# Patient Record
Sex: MALE | Race: BLACK OR AFRICAN AMERICAN | URBAN - METROPOLITAN AREA
[De-identification: names, ages, dates, MRNs, and addresses within clinical notes are randomized per-mention and may not be internally consistent; named-entity substitution may affect disease eponyms.]

---

## 2017-01-05 DIAGNOSIS — G43.009 MIGRAINE WITHOUT AURA AND WITHOUT STATUS MIGRAINOSUS, NOT INTRACTABLE: ICD-10-CM

## 2017-01-05 RX ORDER — RIZATRIPTAN BENZOATE 5 MG/1
5 TABLET ORAL
Qty: 12 TABLET | Refills: 3 | Status: SHIPPED | OUTPATIENT
Start: 2017-01-05 | End: 2017-03-08 | Stop reason: DRUGHIGH

## 2017-02-10 DIAGNOSIS — E78.5 HYPERLIPIDEMIA: ICD-10-CM

## 2017-02-10 DIAGNOSIS — I10 ESSENTIAL HYPERTENSION: ICD-10-CM

## 2017-02-10 RX ORDER — SIMVASTATIN 20 MG
TABLET ORAL
Qty: 90 TABLET | Refills: 1 | Status: SHIPPED | OUTPATIENT
Start: 2017-02-10 | End: 2017-08-07 | Stop reason: SDUPTHER

## 2017-02-10 RX ORDER — AMLODIPINE BESYLATE 10 MG/1
TABLET ORAL
Qty: 90 TABLET | Refills: 1 | Status: SHIPPED | OUTPATIENT
Start: 2017-02-10 | End: 2017-08-07 | Stop reason: SDUPTHER

## 2017-02-16 ENCOUNTER — OFFICE VISIT (OUTPATIENT)
Dept: INTERNAL MEDICINE CLINIC | Age: 57
End: 2017-02-16

## 2017-02-16 VITALS
SYSTOLIC BLOOD PRESSURE: 144 MMHG | WEIGHT: 194.4 LBS | TEMPERATURE: 97.8 F | BODY MASS INDEX: 30.51 KG/M2 | HEART RATE: 56 BPM | DIASTOLIC BLOOD PRESSURE: 80 MMHG | RESPIRATION RATE: 12 BRPM | HEIGHT: 67 IN | OXYGEN SATURATION: 98 %

## 2017-02-16 DIAGNOSIS — M25.649 STIFFNESS OF THUMB JOINT: ICD-10-CM

## 2017-02-16 DIAGNOSIS — E78.5 HYPERLIPIDEMIA, UNSPECIFIED HYPERLIPIDEMIA TYPE: ICD-10-CM

## 2017-02-16 DIAGNOSIS — G43.009 MIGRAINE WITHOUT AURA AND WITHOUT STATUS MIGRAINOSUS, NOT INTRACTABLE: ICD-10-CM

## 2017-02-16 DIAGNOSIS — M79.645 THUMB PAIN, LEFT: ICD-10-CM

## 2017-02-16 DIAGNOSIS — I10 ESSENTIAL HYPERTENSION: Primary | ICD-10-CM

## 2017-02-16 PROCEDURE — 99214 OFFICE O/P EST MOD 30 MIN: CPT | Performed by: INTERNAL MEDICINE

## 2017-02-16 RX ORDER — HYDROCHLOROTHIAZIDE 12.5 MG/1
12.5 TABLET ORAL DAILY
Qty: 30 TABLET | Refills: 0 | Status: SHIPPED | OUTPATIENT
Start: 2017-02-16 | End: 2017-03-27 | Stop reason: SDUPTHER

## 2017-02-16 RX ORDER — RIZATRIPTAN BENZOATE 10 MG/1
10 TABLET ORAL
Qty: 12 TABLET | Refills: 3 | Status: SHIPPED | OUTPATIENT
Start: 2017-02-16 | End: 2019-05-14

## 2017-02-20 ASSESSMENT — ENCOUNTER SYMPTOMS
VOMITING: 0
SORE THROAT: 0
COUGH: 0
CONSTIPATION: 0
WHEEZING: 0
DIARRHEA: 0
ABDOMINAL PAIN: 0
CHEST TIGHTNESS: 0
SHORTNESS OF BREATH: 0
NAUSEA: 0
RHINORRHEA: 0

## 2017-03-08 ENCOUNTER — OFFICE VISIT (OUTPATIENT)
Dept: INTERNAL MEDICINE CLINIC | Age: 57
End: 2017-03-08

## 2017-03-08 VITALS
HEIGHT: 67 IN | DIASTOLIC BLOOD PRESSURE: 90 MMHG | WEIGHT: 196.4 LBS | HEART RATE: 52 BPM | OXYGEN SATURATION: 98 % | TEMPERATURE: 97.9 F | SYSTOLIC BLOOD PRESSURE: 160 MMHG | BODY MASS INDEX: 30.83 KG/M2 | RESPIRATION RATE: 12 BRPM

## 2017-03-08 DIAGNOSIS — I10 ESSENTIAL HYPERTENSION: Primary | ICD-10-CM

## 2017-03-08 PROCEDURE — 99213 OFFICE O/P EST LOW 20 MIN: CPT | Performed by: INTERNAL MEDICINE

## 2017-03-08 RX ORDER — LISINOPRIL 10 MG/1
10 TABLET ORAL DAILY
Qty: 30 TABLET | Refills: 1 | Status: SHIPPED | OUTPATIENT
Start: 2017-03-08 | End: 2017-04-05 | Stop reason: SINTOL

## 2017-03-08 ASSESSMENT — ENCOUNTER SYMPTOMS
SHORTNESS OF BREATH: 0
CHEST TIGHTNESS: 0

## 2017-03-29 RX ORDER — HYDROCHLOROTHIAZIDE 12.5 MG/1
12.5 TABLET ORAL DAILY
Qty: 30 TABLET | Refills: 1 | Status: SHIPPED | OUTPATIENT
Start: 2017-03-29 | End: 2017-04-05 | Stop reason: ALTCHOICE

## 2017-04-05 ENCOUNTER — OFFICE VISIT (OUTPATIENT)
Dept: INTERNAL MEDICINE CLINIC | Age: 57
End: 2017-04-05

## 2017-04-05 VITALS
OXYGEN SATURATION: 99 % | WEIGHT: 195.4 LBS | HEIGHT: 67 IN | DIASTOLIC BLOOD PRESSURE: 84 MMHG | TEMPERATURE: 97.8 F | SYSTOLIC BLOOD PRESSURE: 156 MMHG | BODY MASS INDEX: 30.67 KG/M2 | RESPIRATION RATE: 12 BRPM | HEART RATE: 50 BPM

## 2017-04-05 DIAGNOSIS — I10 ESSENTIAL HYPERTENSION: Primary | ICD-10-CM

## 2017-04-05 PROCEDURE — 99213 OFFICE O/P EST LOW 20 MIN: CPT | Performed by: INTERNAL MEDICINE

## 2017-04-05 RX ORDER — VALSARTAN AND HYDROCHLOROTHIAZIDE 160; 12.5 MG/1; MG/1
1 TABLET, FILM COATED ORAL DAILY
Qty: 30 TABLET | Refills: 1 | Status: SHIPPED | OUTPATIENT
Start: 2017-04-05 | End: 2017-06-01 | Stop reason: SDUPTHER

## 2017-04-05 ASSESSMENT — ENCOUNTER SYMPTOMS
SHORTNESS OF BREATH: 0
CHEST TIGHTNESS: 0

## 2017-05-16 DIAGNOSIS — E79.0 ELEVATED BLOOD URIC ACID LEVEL: ICD-10-CM

## 2017-05-16 RX ORDER — ALLOPURINOL 300 MG/1
300 TABLET ORAL DAILY
Qty: 90 TABLET | Refills: 1 | Status: SHIPPED | OUTPATIENT
Start: 2017-05-16 | End: 2017-11-13 | Stop reason: SDUPTHER

## 2017-06-01 ENCOUNTER — OFFICE VISIT (OUTPATIENT)
Dept: INTERNAL MEDICINE CLINIC | Age: 57
End: 2017-06-01

## 2017-06-01 VITALS
WEIGHT: 195.2 LBS | RESPIRATION RATE: 12 BRPM | DIASTOLIC BLOOD PRESSURE: 76 MMHG | BODY MASS INDEX: 30.64 KG/M2 | OXYGEN SATURATION: 96 % | SYSTOLIC BLOOD PRESSURE: 136 MMHG | TEMPERATURE: 97.9 F | HEIGHT: 67 IN | HEART RATE: 53 BPM

## 2017-06-01 DIAGNOSIS — M10.00 IDIOPATHIC GOUT, UNSPECIFIED CHRONICITY, UNSPECIFIED SITE: ICD-10-CM

## 2017-06-01 DIAGNOSIS — I10 ESSENTIAL HYPERTENSION: ICD-10-CM

## 2017-06-01 DIAGNOSIS — E78.5 HYPERLIPIDEMIA, UNSPECIFIED HYPERLIPIDEMIA TYPE: ICD-10-CM

## 2017-06-01 DIAGNOSIS — E79.0 ELEVATED BLOOD URIC ACID LEVEL: ICD-10-CM

## 2017-06-01 DIAGNOSIS — Z00.00 ANNUAL PHYSICAL EXAM: Primary | ICD-10-CM

## 2017-06-01 DIAGNOSIS — Z12.5 SCREENING PSA (PROSTATE SPECIFIC ANTIGEN): ICD-10-CM

## 2017-06-01 DIAGNOSIS — G43.009 MIGRAINE WITHOUT AURA AND WITHOUT STATUS MIGRAINOSUS, NOT INTRACTABLE: ICD-10-CM

## 2017-06-01 DIAGNOSIS — Z12.11 COLON CANCER SCREENING: ICD-10-CM

## 2017-06-01 LAB
BASOPHILS ABSOLUTE: 0 K/UL (ref 0–0.2)
BASOPHILS RELATIVE PERCENT: 0.5 %
BILIRUBIN, POC: NORMAL
BLOOD URINE, POC: NORMAL
CLARITY, POC: CLEAR
COLOR, POC: YELLOW
EOSINOPHILS ABSOLUTE: 0.2 K/UL (ref 0–0.6)
EOSINOPHILS RELATIVE PERCENT: 3 %
GLUCOSE URINE, POC: NORMAL
HCT VFR BLD CALC: 38.7 % (ref 40.5–52.5)
HEMOCCULT STL QL: NEGATIVE
HEMOCCULT STL QL: NORMAL
HEMOCCULT STL QL: NORMAL
HEMOGLOBIN: 12.6 G/DL (ref 13.5–17.5)
KETONES, POC: NORMAL
LEUKOCYTE EST, POC: NORMAL
LYMPHOCYTES ABSOLUTE: 1.9 K/UL (ref 1–5.1)
LYMPHOCYTES RELATIVE PERCENT: 33.2 %
MCH RBC QN AUTO: 29.8 PG (ref 26–34)
MCHC RBC AUTO-ENTMCNC: 32.6 G/DL (ref 31–36)
MCV RBC AUTO: 91.4 FL (ref 80–100)
MONOCYTES ABSOLUTE: 0.4 K/UL (ref 0–1.3)
MONOCYTES RELATIVE PERCENT: 6.9 %
NEUTROPHILS ABSOLUTE: 3.2 K/UL (ref 1.7–7.7)
NEUTROPHILS RELATIVE PERCENT: 56.4 %
NITRITE, POC: NORMAL
PDW BLD-RTO: 15.4 % (ref 12.4–15.4)
PH, POC: 7.5
PLATELET # BLD: 203 K/UL (ref 135–450)
PMV BLD AUTO: 8 FL (ref 5–10.5)
PROTEIN, POC: NORMAL
RBC # BLD: 4.23 M/UL (ref 4.2–5.9)
SPECIFIC GRAVITY, POC: 1.02
UROBILINOGEN, POC: 0.2
WBC # BLD: 5.7 K/UL (ref 4–11)

## 2017-06-01 PROCEDURE — 81002 URINALYSIS NONAUTO W/O SCOPE: CPT | Performed by: INTERNAL MEDICINE

## 2017-06-01 PROCEDURE — 82270 OCCULT BLOOD FECES: CPT | Performed by: INTERNAL MEDICINE

## 2017-06-01 PROCEDURE — 99396 PREV VISIT EST AGE 40-64: CPT | Performed by: INTERNAL MEDICINE

## 2017-06-01 RX ORDER — VALSARTAN AND HYDROCHLOROTHIAZIDE 160; 12.5 MG/1; MG/1
1 TABLET, FILM COATED ORAL DAILY
Qty: 30 TABLET | Refills: 0 | Status: SHIPPED | OUTPATIENT
Start: 2017-06-01 | End: 2017-06-19 | Stop reason: SDUPTHER

## 2017-06-01 RX ORDER — SUMATRIPTAN 100 MG/1
TABLET, FILM COATED ORAL
COMMUNITY
Start: 2017-03-23 | End: 2017-09-13 | Stop reason: SDUPTHER

## 2017-06-01 ASSESSMENT — PATIENT HEALTH QUESTIONNAIRE - PHQ9
2. FEELING DOWN, DEPRESSED OR HOPELESS: 0
SUM OF ALL RESPONSES TO PHQ9 QUESTIONS 1 & 2: 0
1. LITTLE INTEREST OR PLEASURE IN DOING THINGS: 0
SUM OF ALL RESPONSES TO PHQ QUESTIONS 1-9: 0

## 2017-06-02 LAB
A/G RATIO: 1.9 (ref 1.1–2.2)
ALBUMIN SERPL-MCNC: 4.7 G/DL (ref 3.4–5)
ALP BLD-CCNC: 53 U/L (ref 40–129)
ALT SERPL-CCNC: 24 U/L (ref 10–40)
ANION GAP SERPL CALCULATED.3IONS-SCNC: 13 MMOL/L (ref 3–16)
AST SERPL-CCNC: 32 U/L (ref 15–37)
BILIRUB SERPL-MCNC: 0.5 MG/DL (ref 0–1)
BUN BLDV-MCNC: 23 MG/DL (ref 7–20)
CALCIUM SERPL-MCNC: 9.5 MG/DL (ref 8.3–10.6)
CHLORIDE BLD-SCNC: 101 MMOL/L (ref 99–110)
CHOLESTEROL, TOTAL: 163 MG/DL (ref 0–199)
CO2: 28 MMOL/L (ref 21–32)
CREAT SERPL-MCNC: 1.2 MG/DL (ref 0.9–1.3)
GFR AFRICAN AMERICAN: >60
GFR NON-AFRICAN AMERICAN: >60
GLOBULIN: 2.5 G/DL
GLUCOSE BLD-MCNC: 94 MG/DL (ref 70–99)
HDLC SERPL-MCNC: 56 MG/DL (ref 40–60)
LDL CHOLESTEROL CALCULATED: 88 MG/DL
POTASSIUM SERPL-SCNC: 4.2 MMOL/L (ref 3.5–5.1)
PROSTATE SPECIFIC ANTIGEN: 2.66 NG/ML (ref 0–4)
SODIUM BLD-SCNC: 142 MMOL/L (ref 136–145)
TOTAL PROTEIN: 7.2 G/DL (ref 6.4–8.2)
TRIGL SERPL-MCNC: 93 MG/DL (ref 0–150)
TSH SERPL DL<=0.05 MIU/L-ACNC: 0.69 UIU/ML (ref 0.27–4.2)
URIC ACID, SERUM: 5.7 MG/DL (ref 3.5–7.2)
VLDLC SERPL CALC-MCNC: 19 MG/DL

## 2017-06-05 ASSESSMENT — ENCOUNTER SYMPTOMS
RHINORRHEA: 0
VOMITING: 0
EYE REDNESS: 0
BACK PAIN: 0
SINUS PRESSURE: 0
CONSTIPATION: 0
CHOKING: 0
EYE PAIN: 0
EYE DISCHARGE: 0
RECTAL PAIN: 0
VOICE CHANGE: 0
ANAL BLEEDING: 0
FACIAL SWELLING: 0
ABDOMINAL PAIN: 0
COUGH: 0
BLOOD IN STOOL: 0
WHEEZING: 0
ABDOMINAL DISTENTION: 0
NAUSEA: 0
TROUBLE SWALLOWING: 0
CHEST TIGHTNESS: 0
EYE ITCHING: 0
SHORTNESS OF BREATH: 0
APNEA: 0
PHOTOPHOBIA: 0
SORE THROAT: 0
DIARRHEA: 0

## 2017-06-18 ENCOUNTER — PATIENT MESSAGE (OUTPATIENT)
Dept: INTERNAL MEDICINE CLINIC | Age: 57
End: 2017-06-18

## 2017-06-18 DIAGNOSIS — I10 ESSENTIAL HYPERTENSION: ICD-10-CM

## 2017-06-19 DIAGNOSIS — I10 ESSENTIAL HYPERTENSION: ICD-10-CM

## 2017-06-19 RX ORDER — VALSARTAN AND HYDROCHLOROTHIAZIDE 160; 12.5 MG/1; MG/1
1 TABLET, FILM COATED ORAL DAILY
Qty: 90 TABLET | Refills: 1 | Status: SHIPPED | OUTPATIENT
Start: 2017-06-19 | End: 2017-10-06 | Stop reason: SDUPTHER

## 2017-06-20 RX ORDER — VALSARTAN AND HYDROCHLOROTHIAZIDE 160; 12.5 MG/1; MG/1
TABLET, FILM COATED ORAL
Qty: 30 TABLET | Refills: 2 | Status: SHIPPED | OUTPATIENT
Start: 2017-06-20 | End: 2017-10-02 | Stop reason: SDUPTHER

## 2017-06-26 RX ORDER — CLONIDINE HYDROCHLORIDE 0.1 MG/1
TABLET ORAL
Qty: 270 TABLET | Refills: 2 | Status: SHIPPED | OUTPATIENT
Start: 2017-06-26 | End: 2018-03-23 | Stop reason: SDUPTHER

## 2017-08-07 DIAGNOSIS — I10 ESSENTIAL HYPERTENSION: ICD-10-CM

## 2017-08-07 DIAGNOSIS — E78.5 HYPERLIPIDEMIA: ICD-10-CM

## 2017-08-07 RX ORDER — SIMVASTATIN 20 MG
TABLET ORAL
Qty: 90 TABLET | Refills: 2 | Status: SHIPPED | OUTPATIENT
Start: 2017-08-07 | End: 2018-05-04 | Stop reason: SDUPTHER

## 2017-08-07 RX ORDER — AMLODIPINE BESYLATE 10 MG/1
TABLET ORAL
Qty: 90 TABLET | Refills: 2 | Status: SHIPPED | OUTPATIENT
Start: 2017-08-07 | End: 2018-05-04 | Stop reason: SDUPTHER

## 2017-08-10 DIAGNOSIS — E78.5 HYPERLIPIDEMIA: ICD-10-CM

## 2017-08-10 DIAGNOSIS — I10 ESSENTIAL HYPERTENSION: ICD-10-CM

## 2017-08-10 RX ORDER — AMLODIPINE BESYLATE 10 MG/1
TABLET ORAL
Qty: 90 TABLET | Refills: 2 | Status: CANCELLED | OUTPATIENT
Start: 2017-08-10

## 2017-08-10 RX ORDER — SIMVASTATIN 20 MG
TABLET ORAL
Qty: 90 TABLET | Refills: 2 | Status: CANCELLED | OUTPATIENT
Start: 2017-08-10

## 2017-09-12 ENCOUNTER — PATIENT MESSAGE (OUTPATIENT)
Dept: INTERNAL MEDICINE CLINIC | Age: 57
End: 2017-09-12

## 2017-09-13 RX ORDER — SUMATRIPTAN 100 MG/1
100 TABLET, FILM COATED ORAL
Qty: 9 TABLET | Refills: 5 | Status: SHIPPED | OUTPATIENT
Start: 2017-09-13 | End: 2017-12-05 | Stop reason: RX

## 2017-10-01 ENCOUNTER — PATIENT MESSAGE (OUTPATIENT)
Dept: INTERNAL MEDICINE CLINIC | Age: 57
End: 2017-10-01

## 2017-10-02 ENCOUNTER — PATIENT MESSAGE (OUTPATIENT)
Dept: INTERNAL MEDICINE CLINIC | Age: 57
End: 2017-10-02

## 2017-10-02 DIAGNOSIS — I10 ESSENTIAL HYPERTENSION: ICD-10-CM

## 2017-10-02 RX ORDER — VALSARTAN AND HYDROCHLOROTHIAZIDE 160; 12.5 MG/1; MG/1
TABLET, FILM COATED ORAL
Qty: 90 TABLET | Refills: 1 | Status: SHIPPED | OUTPATIENT
Start: 2017-10-02 | End: 2018-03-24 | Stop reason: SDUPTHER

## 2017-10-06 ENCOUNTER — OFFICE VISIT (OUTPATIENT)
Dept: INTERNAL MEDICINE CLINIC | Age: 57
End: 2017-10-06

## 2017-10-06 VITALS
WEIGHT: 194.8 LBS | HEIGHT: 67 IN | TEMPERATURE: 98.2 F | OXYGEN SATURATION: 98 % | HEART RATE: 50 BPM | SYSTOLIC BLOOD PRESSURE: 122 MMHG | BODY MASS INDEX: 30.57 KG/M2 | DIASTOLIC BLOOD PRESSURE: 72 MMHG | RESPIRATION RATE: 12 BRPM

## 2017-10-06 DIAGNOSIS — D64.9 ANEMIA, UNSPECIFIED TYPE: ICD-10-CM

## 2017-10-06 DIAGNOSIS — E79.0 ELEVATED BLOOD URIC ACID LEVEL: ICD-10-CM

## 2017-10-06 DIAGNOSIS — E78.5 HYPERLIPIDEMIA, UNSPECIFIED HYPERLIPIDEMIA TYPE: ICD-10-CM

## 2017-10-06 DIAGNOSIS — G43.009 MIGRAINE WITHOUT AURA AND WITHOUT STATUS MIGRAINOSUS, NOT INTRACTABLE: ICD-10-CM

## 2017-10-06 DIAGNOSIS — I10 ESSENTIAL HYPERTENSION: Primary | ICD-10-CM

## 2017-10-06 PROCEDURE — 99214 OFFICE O/P EST MOD 30 MIN: CPT | Performed by: INTERNAL MEDICINE

## 2017-10-06 ASSESSMENT — ENCOUNTER SYMPTOMS
SORE THROAT: 0
RHINORRHEA: 0
CONSTIPATION: 0
ABDOMINAL PAIN: 0
SHORTNESS OF BREATH: 0
DIARRHEA: 0
CHEST TIGHTNESS: 0
COUGH: 0
NAUSEA: 0
WHEEZING: 0
VOMITING: 0

## 2017-10-06 NOTE — MR AVS SNAPSHOT
Your Goals as of 10/6/2017 at 8:29 AM                 Diet    Calorie intake = 1800 (pt-stated)     Notes    02/16/2017: HTN    Barriers to success: stress  Plan for overcoming my barriers: keeping a better eye on the amount of foods eaten daily  Confidence: 8/10  Date goal set: 2/16/17  Date goal attained:             Immunizations as of 10/6/2017     Name Date    Influenza Vaccine, unspecified formulation 10/15/2016    Influenza Virus Vaccine 10/3/2015, 10/15/2014    Tdap (Boostrix, Adacel) 12/20/2013      Preventive Care        Date Due    Yearly Flu Vaccine (1) 12/31/2017 (Originally 9/1/2017)    Colon Cancer Stool Test 6/1/2018    Cholesterol Screening 6/1/2022    Tetanus Combination Vaccine (2 - Td) 12/20/2023            [employer] Signup           Our records indicate that you have an active [facility] account. You can view your After Visit Summary by going to [email or web address]" and logging in with your [facility] username and password. If you don't have a [facility] username and password but a parent or guardian has access to your record, the parent or guardian should login with their own [facility] username and password and access your record to view the After Visit Summary. Additional Information  If you have questions, please contact the physician practice where you receive care. Remember, [facility] is NOT to be used for urgent needs. For medical emergencies, dial 911. For questions regarding your [facility] account call [phone]. If you have a clinical question, please call your doctor's office.

## 2017-10-06 NOTE — PROGRESS NOTES
Camila Simmons   YOB: 1960    Date of Visit:  10/6/2017    Chief Complaint   Patient presents with    Hypertension    Hyperlipidemia    Headache       HPI  Pt has hypertension. Pt monitors his BP and it averages 120's-130's/60's-70's. Pt decreases salt in his diet. Pt walks for exercise. Pt has hyperlipidemia. Pt takes Simvastatin. Pt decreases fat and cholesterol in his diet. Pt has migraines. Pt states he has had 3 migraines since last visit and the most recent was 2 weeks ago. Pt's migraines are triggered by chocolate. Pt takes Imitrex prn. Pt has gout. Pt denies recent gout attacks. Pt takes Allopurinol for elevated uric acid. Review of Systems   Constitutional: Negative for activity change, chills, fatigue and fever. HENT: Negative for congestion, postnasal drip, rhinorrhea and sore throat. Eyes: Negative for visual disturbance. Respiratory: Negative for cough, chest tightness, shortness of breath and wheezing. Cardiovascular: Negative for chest pain, palpitations and leg swelling. Gastrointestinal: Negative for abdominal pain, constipation, diarrhea, nausea and vomiting. Genitourinary: Negative for dysuria, frequency and hematuria. Musculoskeletal: Negative for arthralgias and myalgias. Neurological: Positive for headaches. Negative for dizziness, syncope, light-headedness and numbness. Psychiatric/Behavioral: Negative for dysphoric mood and sleep disturbance. The patient is not nervous/anxious.         Allergies   Allergen Reactions    Lisinopril Other (See Comments)     Feeling of throat swelling     Outpatient Prescriptions Marked as Taking for the 10/6/17 encounter (Office Visit) with Jaswinder Fraser MD   Medication Sig Dispense Refill    valsartan-hydrochlorothiazide (DIOVAN-HCT) 160-12.5 MG per tablet TAKE ONE TABLET BY MOUTH DAILY 90 tablet 1    amLODIPine (NORVASC) 10 MG tablet TAKE 1 TABLET DAILY 90 tablet 2    simvastatin (ZOCOR) 20 MG tablet TAKE 1 TABLET NIGHTLY 90 tablet 2    cloNIDine (CATAPRES) 0.1 MG tablet TAKE 1 TABLET THREE TIMES A  tablet 2    allopurinol (ZYLOPRIM) 300 MG tablet Take 1 tablet by mouth daily 90 tablet 1    rizatriptan (MAXALT) 10 MG tablet Take 1 tablet by mouth once as needed for Migraine May repeat in 2 hours if needed 12 tablet 3    propranolol (INDERAL) 10 MG tablet Take 1 tablet by mouth 2 times daily 180 tablet 1    Multiple Vitamins-Minerals (MULTIVITAMIN GUMMIES ADULTS) CHEW Take 2 each by mouth daily           Vitals:    10/06/17 0803   BP: 122/72   Site: Left Arm   Position: Sitting   Cuff Size: Large Adult   Pulse: 50   Resp: 12   Temp: 98.2 °F (36.8 °C)   TempSrc: Oral   SpO2: 98%   Weight: 194 lb 12.8 oz (88.4 kg)   Height: 5' 6.75\" (1.695 m)     Body mass index is 30.74 kg/(m^2). Physical Exam   Constitutional: He appears well-developed and well-nourished. No distress. Middle aged BM   HENT:   Mouth/Throat: Oropharynx is clear and moist and mucous membranes are normal.   Eyes: Conjunctivae, EOM and lids are normal. Pupils are equal, round, and reactive to light. Neck: Neck supple. Carotid bruit is not present. No thyromegaly present. Cardiovascular: Normal rate, regular rhythm, S1 normal, S2 normal and normal heart sounds. Exam reveals no gallop and no friction rub. No murmur heard. Pulmonary/Chest: Effort normal and breath sounds normal. No respiratory distress. He has no wheezes. He has no rhonchi. He has no rales. Abdominal: Soft. Normal appearance and bowel sounds are normal. He exhibits no distension. There is no hepatosplenomegaly. There is no tenderness. Musculoskeletal: He exhibits no edema. Lymphadenopathy:        Head (right side): No submandibular adenopathy present. Head (left side): No submandibular adenopathy present. Psychiatric: He has a normal mood and affect. Nursing note reviewed.       Lab Results   Component Value Date     06/01/2017    K 4.2 06/01/2017     06/01/2017    CO2 28 06/01/2017    BUN 23 (H) 06/01/2017    CREATININE 1.2 06/01/2017    GLUCOSE 94 06/01/2017    CALCIUM 9.5 06/01/2017    PROT 7.2 06/01/2017    LABALBU 4.7 06/01/2017    BILITOT 0.5 06/01/2017    ALKPHOS 53 06/01/2017    AST 32 06/01/2017    ALT 24 06/01/2017    LABGLOM >60 06/01/2017    GFRAA >60 06/01/2017    AGRATIO 1.9 06/01/2017    GLOB 2.5 06/01/2017     Lab Results   Component Value Date    WBC 5.7 06/01/2017    RBC 4.23 06/01/2017    HGB 12.6 06/01/2017    HCT 38.7 06/01/2017    MCV 91.4 06/01/2017    MCH 29.8 06/01/2017    MCHC 32.6 06/01/2017    RDW 15.4 06/01/2017     06/01/2017    MPV 8.0 06/01/2017    NEUTOPHILPCT 56.4 06/01/2017    LYMPHOPCT 33.2 06/01/2017    MONOPCT 6.9 06/01/2017    EOSRELPCT 3.0 06/01/2017    BASOPCT 0.5 06/01/2017    NEUTROABS 3.2 06/01/2017    LYMPHSABS 1.9 06/01/2017    MONOSABS 0.4 06/01/2017    EOSABS 0.2 06/01/2017    BASOSABS 0.0 06/01/2017     Lab Results   Component Value Date    CHOL 163 06/01/2017    TRIG 93 06/01/2017    HDL 56 06/01/2017    LDLCALC 88 06/01/2017     Lab Results   Component Value Date    TSH 0.69 06/01/2017     Lab Results   Component Value Date    PSA 2.66 06/01/2017     Lab Results   Component Value Date    LABURIC 5.7 06/01/2017         Assessment/Plan     1. Essential hypertension  -blood pressure normal  -Continue same medications  -Low sodium diet  -Regular aerobic exercise    2. Hyperlipidemia, unspecified hyperlipidemia type  -lipid panel normal 6/1/17  -Continue same medications  -Low fat, low cholesterol diet  -Regular aerobic exercise    3. Migraine without aura and without status migrainosus, not intractable  -stable  -Continue same medications  -avoid migraine triggers    4. Elevated blood uric acid level  -stable  -no recent gout attacks  -Continue same medications    5.  Anemia, unspecified type  -mild and stable      Discussed medications with patient, who voiced understanding of their use

## 2017-11-12 ENCOUNTER — PATIENT MESSAGE (OUTPATIENT)
Dept: INTERNAL MEDICINE CLINIC | Age: 57
End: 2017-11-12

## 2017-11-12 DIAGNOSIS — E79.0 ELEVATED BLOOD URIC ACID LEVEL: ICD-10-CM

## 2017-11-13 RX ORDER — ALLOPURINOL 300 MG/1
300 TABLET ORAL DAILY
Qty: 90 TABLET | Refills: 1 | Status: SHIPPED | OUTPATIENT
Start: 2017-11-13 | End: 2018-04-24 | Stop reason: SDUPTHER

## 2017-12-04 ENCOUNTER — TELEPHONE (OUTPATIENT)
Dept: INTERNAL MEDICINE CLINIC | Age: 57
End: 2017-12-04

## 2017-12-04 DIAGNOSIS — G43.009 MIGRAINE WITHOUT AURA AND WITHOUT STATUS MIGRAINOSUS, NOT INTRACTABLE: Primary | ICD-10-CM

## 2017-12-04 NOTE — TELEPHONE ENCOUNTER
Joseline calling said      SUMAtriptan (IMITREX) 100 MG tablet is on back order do you want to change or order another strength joseline [phone] -

## 2017-12-05 RX ORDER — SUMATRIPTAN 50 MG/1
100 TABLET, FILM COATED ORAL
Qty: 18 TABLET | Refills: 3 | Status: SHIPPED | OUTPATIENT
Start: 2017-12-05 | End: 2017-12-06 | Stop reason: SDUPTHER

## 2017-12-06 DIAGNOSIS — G43.009 MIGRAINE WITHOUT AURA AND WITHOUT STATUS MIGRAINOSUS, NOT INTRACTABLE: ICD-10-CM

## 2017-12-06 RX ORDER — SUMATRIPTAN 50 MG/1
100 TABLET, FILM COATED ORAL
Qty: 18 TABLET | Refills: 3 | Status: SHIPPED | OUTPATIENT
Start: 2017-12-06 | End: 2019-05-14 | Stop reason: ALTCHOICE

## 2017-12-06 NOTE — TELEPHONE ENCOUNTER
Pharmacy called  - Imitrex sent to [facility] instead of [address]. [employer] added to patient's chart. Needs to have Rx resent.

## 2017-12-06 NOTE — TELEPHONE ENCOUNTER
Rx for Imitrex changed to 50mg for patient to take 2 tablets to replace Imitrex 100mg. Left message on pt's voicemail to inform him.

## 2017-12-06 NOTE — TELEPHONE ENCOUNTER
New script for Imitrex  50 mg was sent to wrong pharmacy please sent to berenice [phone] this pharmacy is not in his chart

## 2018-01-28 ENCOUNTER — PATIENT MESSAGE (OUTPATIENT)
Dept: INTERNAL MEDICINE CLINIC | Age: 58
End: 2018-01-28

## 2018-01-28 DIAGNOSIS — G43.009 MIGRAINE WITHOUT AURA AND WITHOUT STATUS MIGRAINOSUS, NOT INTRACTABLE: ICD-10-CM

## 2018-01-28 DIAGNOSIS — I10 ESSENTIAL HYPERTENSION: ICD-10-CM

## 2018-01-29 RX ORDER — PROPRANOLOL HYDROCHLORIDE 10 MG/1
10 TABLET ORAL 2 TIMES DAILY
Qty: 180 TABLET | Refills: 1 | Status: SHIPPED | OUTPATIENT
Start: 2018-01-29 | End: 2018-09-14 | Stop reason: SDUPTHER

## 2018-01-29 NOTE — TELEPHONE ENCOUNTER
From: Cloyce Lombard  To: Alek Love MD  Sent: 1/28/2018 8:46 PM EST  Subject: Prescription Question    I need a refill on Propranolol. Can I have a 90 day supply from [facility]?

## 2018-03-23 RX ORDER — CLONIDINE HYDROCHLORIDE 0.1 MG/1
TABLET ORAL
Qty: 270 TABLET | Refills: 2 | Status: SHIPPED | OUTPATIENT
Start: 2018-03-23 | End: 2018-12-18 | Stop reason: SDUPTHER

## 2018-03-23 NOTE — TELEPHONE ENCOUNTER
Last Office Visit: 10/6/2017  Future Office Visit: 4/6/2018  Last Filled: 12/16/2017 Clonidine 0.1 mg tab # 270

## 2018-03-24 DIAGNOSIS — I10 ESSENTIAL HYPERTENSION: ICD-10-CM

## 2018-03-26 DIAGNOSIS — I10 ESSENTIAL HYPERTENSION: ICD-10-CM

## 2018-03-26 RX ORDER — VALSARTAN AND HYDROCHLOROTHIAZIDE 160; 12.5 MG/1; MG/1
TABLET, FILM COATED ORAL
Qty: 90 TABLET | Refills: 0 | OUTPATIENT
Start: 2018-03-26

## 2018-03-26 RX ORDER — VALSARTAN AND HYDROCHLOROTHIAZIDE 160; 12.5 MG/1; MG/1
TABLET, FILM COATED ORAL
Qty: 90 TABLET | Refills: 0 | Status: SHIPPED | OUTPATIENT
Start: 2018-03-26 | End: 2018-06-18 | Stop reason: SDUPTHER

## 2018-03-26 NOTE — TELEPHONE ENCOUNTER
Last Office Visit: 10/6/2017  Future Office Visit: 4/6/2018  Last Filled: 12/25/2017 Valsartan-hydrochlorothiazide 160-12.5 mg per tab # 90

## 2018-04-06 ENCOUNTER — OFFICE VISIT (OUTPATIENT)
Dept: INTERNAL MEDICINE CLINIC | Age: 58
End: 2018-04-06

## 2018-04-06 VITALS
SYSTOLIC BLOOD PRESSURE: 110 MMHG | WEIGHT: 212.2 LBS | HEIGHT: 67 IN | OXYGEN SATURATION: 95 % | BODY MASS INDEX: 33.3 KG/M2 | HEART RATE: 52 BPM | DIASTOLIC BLOOD PRESSURE: 60 MMHG

## 2018-04-06 DIAGNOSIS — E79.0 ELEVATED BLOOD URIC ACID LEVEL: ICD-10-CM

## 2018-04-06 DIAGNOSIS — I10 ESSENTIAL HYPERTENSION: Primary | ICD-10-CM

## 2018-04-06 DIAGNOSIS — E78.2 MIXED HYPERLIPIDEMIA: ICD-10-CM

## 2018-04-06 DIAGNOSIS — G43.009 MIGRAINE WITHOUT AURA AND WITHOUT STATUS MIGRAINOSUS, NOT INTRACTABLE: ICD-10-CM

## 2018-04-06 DIAGNOSIS — I10 ESSENTIAL HYPERTENSION: ICD-10-CM

## 2018-04-06 LAB
A/G RATIO: 2 (ref 1.1–2.2)
ALBUMIN SERPL-MCNC: 4.6 G/DL (ref 3.4–5)
ALP BLD-CCNC: 52 U/L (ref 40–129)
ALT SERPL-CCNC: 31 U/L (ref 10–40)
ANION GAP SERPL CALCULATED.3IONS-SCNC: 12 MMOL/L (ref 3–16)
AST SERPL-CCNC: 30 U/L (ref 15–37)
BILIRUB SERPL-MCNC: 0.3 MG/DL (ref 0–1)
BUN BLDV-MCNC: 15 MG/DL (ref 7–20)
CALCIUM SERPL-MCNC: 9.6 MG/DL (ref 8.3–10.6)
CHLORIDE BLD-SCNC: 101 MMOL/L (ref 99–110)
CHOLESTEROL, TOTAL: 175 MG/DL (ref 0–199)
CO2: 31 MMOL/L (ref 21–32)
CREAT SERPL-MCNC: 1.2 MG/DL (ref 0.8–1.3)
GFR AFRICAN AMERICAN: >60
GFR NON-AFRICAN AMERICAN: >60
GLOBULIN: 2.3 G/DL
GLUCOSE BLD-MCNC: 111 MG/DL (ref 70–99)
HDLC SERPL-MCNC: 53 MG/DL (ref 40–60)
LDL CHOLESTEROL CALCULATED: 94 MG/DL
POTASSIUM SERPL-SCNC: 4.3 MMOL/L (ref 3.5–5.1)
SODIUM BLD-SCNC: 144 MMOL/L (ref 136–145)
TOTAL PROTEIN: 6.9 G/DL (ref 6.4–8.2)
TRIGL SERPL-MCNC: 141 MG/DL (ref 0–150)
URIC ACID, SERUM: 5.3 MG/DL (ref 3.5–7.2)
VLDLC SERPL CALC-MCNC: 28 MG/DL

## 2018-04-06 PROCEDURE — 99214 OFFICE O/P EST MOD 30 MIN: CPT | Performed by: INTERNAL MEDICINE

## 2018-04-06 ASSESSMENT — ENCOUNTER SYMPTOMS
COUGH: 0
WHEEZING: 0
ABDOMINAL PAIN: 0
NAUSEA: 0
DIARRHEA: 0
CONSTIPATION: 0
SHORTNESS OF BREATH: 0
CHEST TIGHTNESS: 0
RHINORRHEA: 0
VOMITING: 0
SORE THROAT: 0

## 2018-04-24 DIAGNOSIS — E79.0 ELEVATED BLOOD URIC ACID LEVEL: ICD-10-CM

## 2018-04-24 RX ORDER — ALLOPURINOL 300 MG/1
300 TABLET ORAL DAILY
Qty: 90 TABLET | Refills: 1 | Status: SHIPPED | OUTPATIENT
Start: 2018-04-24 | End: 2018-09-29 | Stop reason: SDUPTHER

## 2018-05-01 DIAGNOSIS — E79.0 ELEVATED BLOOD URIC ACID LEVEL: ICD-10-CM

## 2018-05-01 RX ORDER — ALLOPURINOL 300 MG/1
300 TABLET ORAL DAILY
Qty: 90 TABLET | Refills: 1 | Status: SHIPPED | OUTPATIENT
Start: 2018-05-01 | End: 2019-05-14 | Stop reason: SDUPTHER

## 2018-05-04 DIAGNOSIS — I10 ESSENTIAL HYPERTENSION: ICD-10-CM

## 2018-05-04 DIAGNOSIS — E78.5 HYPERLIPIDEMIA: ICD-10-CM

## 2018-05-04 RX ORDER — SIMVASTATIN 20 MG
TABLET ORAL
Qty: 90 TABLET | Refills: 1 | Status: SHIPPED | OUTPATIENT
Start: 2018-05-04 | End: 2018-10-31 | Stop reason: SDUPTHER

## 2018-05-04 RX ORDER — AMLODIPINE BESYLATE 10 MG/1
TABLET ORAL
Qty: 90 TABLET | Refills: 1 | Status: SHIPPED | OUTPATIENT
Start: 2018-05-04 | End: 2018-10-31 | Stop reason: SDUPTHER

## 2018-06-18 DIAGNOSIS — G43.009 MIGRAINE WITHOUT AURA AND WITHOUT STATUS MIGRAINOSUS, NOT INTRACTABLE: ICD-10-CM

## 2018-06-18 DIAGNOSIS — I10 ESSENTIAL HYPERTENSION: ICD-10-CM

## 2018-06-18 RX ORDER — PROPRANOLOL HYDROCHLORIDE 10 MG/1
10 TABLET ORAL 2 TIMES DAILY
Qty: 180 TABLET | Refills: 1 | Status: CANCELLED | OUTPATIENT
Start: 2018-06-18

## 2018-06-18 RX ORDER — VALSARTAN AND HYDROCHLOROTHIAZIDE 160; 12.5 MG/1; MG/1
1 TABLET, FILM COATED ORAL DAILY
Qty: 90 TABLET | Refills: 1 | Status: SHIPPED | OUTPATIENT
Start: 2018-06-18 | End: 2019-05-14 | Stop reason: ALTCHOICE

## 2018-06-26 DIAGNOSIS — I10 ESSENTIAL HYPERTENSION: ICD-10-CM

## 2018-06-26 RX ORDER — VALSARTAN AND HYDROCHLOROTHIAZIDE 160; 12.5 MG/1; MG/1
TABLET, FILM COATED ORAL
Qty: 90 TABLET | Refills: 1 | Status: SHIPPED | OUTPATIENT
Start: 2018-06-26 | End: 2019-05-14 | Stop reason: SDUPTHER

## 2018-09-14 DIAGNOSIS — I10 ESSENTIAL HYPERTENSION: ICD-10-CM

## 2018-09-14 DIAGNOSIS — G43.009 MIGRAINE WITHOUT AURA AND WITHOUT STATUS MIGRAINOSUS, NOT INTRACTABLE: ICD-10-CM

## 2018-09-14 RX ORDER — PROPRANOLOL HYDROCHLORIDE 10 MG/1
TABLET ORAL
Qty: 180 TABLET | Refills: 0 | Status: SHIPPED | OUTPATIENT
Start: 2018-09-14 | End: 2018-12-11 | Stop reason: SDUPTHER

## 2018-09-14 NOTE — TELEPHONE ENCOUNTER
Medication:   Requested Prescriptions     Pending Prescriptions Disp Refills    propranolol (INDERAL) 10 MG tablet [Pharmacy Med Name: PROPRANOLOL 10MG TABLETS] 180 tablet 0     Sig: TAKE 1 TABLET BY MOUTH TWICE DAILY       Last Filled:  1/29/18    Patient Phone Number: [phone] (home) [phone] (work)    Last appt: 4/6/2018   Next appt: Visit date not found    Last Labs DM:   Lab Results   Component Value Date    LABA1C 5.1 12/31/2015     Last Lipid:   Lab Results   Component Value Date    CHOL 175 04/06/2018    TRIG 141 04/06/2018    HDL 53 04/06/2018    [address] 94 04/06/2018     Last PSA:   Lab Results   Component Value Date    PSA 2.66 06/01/2017     Last Thyroid:   Lab Results   Component Value Date    TSH 0.69 06/01/2017       Last OARRS: No flowsheet data found.     Preferred Pharmacy:   [facility] [address] [place] [address] Ahmet Mattson [phone]  Gaurang Thompson [address] [place] [address]  Phone: [phone] Fax: [phone]

## 2018-09-29 DIAGNOSIS — E79.0 ELEVATED BLOOD URIC ACID LEVEL: ICD-10-CM

## 2018-10-01 RX ORDER — ALLOPURINOL 300 MG/1
TABLET ORAL
Qty: 90 TABLET | Refills: 1 | Status: SHIPPED | OUTPATIENT
Start: 2018-10-01 | End: 2019-03-10 | Stop reason: SDUPTHER

## 2018-10-01 NOTE — TELEPHONE ENCOUNTER
Medication:   Requested Prescriptions     Pending Prescriptions Disp Refills    allopurinol (ZYLOPRIM) 300 MG tablet [Pharmacy Med Name: ALLOPURINOL TABS 300MG] 90 tablet 1     Sig: TAKE 1 TABLET DAILY        Last Filled:  7/16/2018    Patient Phone Number: [phone] (home) [phone] (work)    Last appt: 4/6/2018   Next appt: 10/8/2018    Last OARRS: No flowsheet data found.     Preferred Pharmacy:   [address] [place] [address] , [address] [facility] [phone] - F [phone]  [facility] [address]  Phone: [phone] Fax: [phone] [facility] [address] [place], [place] - [address] AND [address] [facility] Alana Dhaliwal [phone] [address] [facility]  Phone: [phone] Fax: [phone]    [address] [facility] [address] [facility] [address] [place] [phone] Alana Dhaliwal [phone]  [address] [place] [address]  Phone: [phone] Fax: [phone]

## 2018-10-08 ENCOUNTER — OFFICE VISIT (OUTPATIENT)
Dept: INTERNAL MEDICINE CLINIC | Age: 58
End: 2018-10-08
Payer: COMMERCIAL

## 2018-10-08 VITALS
WEIGHT: 167.6 LBS | SYSTOLIC BLOOD PRESSURE: 118 MMHG | HEIGHT: 67 IN | DIASTOLIC BLOOD PRESSURE: 70 MMHG | OXYGEN SATURATION: 98 % | RESPIRATION RATE: 12 BRPM | TEMPERATURE: 97.4 F | HEART RATE: 46 BPM | BODY MASS INDEX: 26.3 KG/M2

## 2018-10-08 DIAGNOSIS — E79.0 ELEVATED BLOOD URIC ACID LEVEL: ICD-10-CM

## 2018-10-08 DIAGNOSIS — Z13.6 SCREENING FOR ISCHEMIC HEART DISEASE: ICD-10-CM

## 2018-10-08 DIAGNOSIS — R73.9 HYPERGLYCEMIA: ICD-10-CM

## 2018-10-08 DIAGNOSIS — Z12.11 SCREENING FOR COLON CANCER: ICD-10-CM

## 2018-10-08 DIAGNOSIS — R00.1 BRADYCARDIA: ICD-10-CM

## 2018-10-08 DIAGNOSIS — E78.2 MIXED HYPERLIPIDEMIA: ICD-10-CM

## 2018-10-08 DIAGNOSIS — Z12.5 SCREENING PSA (PROSTATE SPECIFIC ANTIGEN): ICD-10-CM

## 2018-10-08 DIAGNOSIS — Z00.00 ANNUAL PHYSICAL EXAM: Primary | ICD-10-CM

## 2018-10-08 DIAGNOSIS — G43.009 MIGRAINE WITHOUT AURA AND WITHOUT STATUS MIGRAINOSUS, NOT INTRACTABLE: ICD-10-CM

## 2018-10-08 DIAGNOSIS — I10 ESSENTIAL HYPERTENSION: ICD-10-CM

## 2018-10-08 DIAGNOSIS — Z00.00 ANNUAL PHYSICAL EXAM: ICD-10-CM

## 2018-10-08 LAB
A/G RATIO: 2.1 (ref 1.1–2.2)
ALBUMIN SERPL-MCNC: 5 G/DL (ref 3.4–5)
ALP BLD-CCNC: 47 U/L (ref 40–129)
ALT SERPL-CCNC: 16 U/L (ref 10–40)
ANION GAP SERPL CALCULATED.3IONS-SCNC: 14 MMOL/L (ref 3–16)
AST SERPL-CCNC: 26 U/L (ref 15–37)
BASOPHILS ABSOLUTE: 0 K/UL (ref 0–0.2)
BASOPHILS RELATIVE PERCENT: 0.5 %
BILIRUB SERPL-MCNC: 0.7 MG/DL (ref 0–1)
BILIRUBIN, POC: NORMAL
BLOOD URINE, POC: NORMAL
BUN BLDV-MCNC: 14 MG/DL (ref 7–20)
CALCIUM SERPL-MCNC: 10.5 MG/DL (ref 8.3–10.6)
CHLORIDE BLD-SCNC: 96 MMOL/L (ref 99–110)
CHOLESTEROL, TOTAL: 178 MG/DL (ref 0–199)
CLARITY, POC: CLEAR
CO2: 28 MMOL/L (ref 21–32)
COLOR, POC: YELLOW
CREAT SERPL-MCNC: 1.2 MG/DL (ref 0.8–1.3)
EOSINOPHILS ABSOLUTE: 0.1 K/UL (ref 0–0.6)
EOSINOPHILS RELATIVE PERCENT: 1.6 %
GFR AFRICAN AMERICAN: >60
GFR NON-AFRICAN AMERICAN: >60
GLOBULIN: 2.4 G/DL
GLUCOSE BLD-MCNC: 96 MG/DL (ref 70–99)
GLUCOSE URINE, POC: NORMAL
HCT VFR BLD CALC: 41.3 % (ref 40.5–52.5)
HDLC SERPL-MCNC: 67 MG/DL (ref 40–60)
HEMOGLOBIN: 13.5 G/DL (ref 13.5–17.5)
KETONES, POC: NORMAL
LDL CHOLESTEROL CALCULATED: 93 MG/DL
LEUKOCYTE EST, POC: NORMAL
LYMPHOCYTES ABSOLUTE: 1.7 K/UL (ref 1–5.1)
LYMPHOCYTES RELATIVE PERCENT: 37.3 %
MCH RBC QN AUTO: 31 PG (ref 26–34)
MCHC RBC AUTO-ENTMCNC: 32.7 G/DL (ref 31–36)
MCV RBC AUTO: 94.8 FL (ref 80–100)
MONOCYTES ABSOLUTE: 0.4 K/UL (ref 0–1.3)
MONOCYTES RELATIVE PERCENT: 7.8 %
NEUTROPHILS ABSOLUTE: 2.4 K/UL (ref 1.7–7.7)
NEUTROPHILS RELATIVE PERCENT: 52.8 %
NITRITE, POC: NORMAL
PDW BLD-RTO: 16 % (ref 12.4–15.4)
PH, POC: 7
PLATELET # BLD: 188 K/UL (ref 135–450)
PMV BLD AUTO: 8.5 FL (ref 5–10.5)
POTASSIUM SERPL-SCNC: 4.6 MMOL/L (ref 3.5–5.1)
PROSTATE SPECIFIC ANTIGEN: 2.74 NG/ML (ref 0–4)
PROTEIN, POC: NORMAL
RBC # BLD: 4.35 M/UL (ref 4.2–5.9)
SODIUM BLD-SCNC: 138 MMOL/L (ref 136–145)
SPECIFIC GRAVITY, POC: 1.02
TOTAL PROTEIN: 7.4 G/DL (ref 6.4–8.2)
TRIGL SERPL-MCNC: 89 MG/DL (ref 0–150)
TSH SERPL DL<=0.05 MIU/L-ACNC: 1.08 UIU/ML (ref 0.27–4.2)
URIC ACID, SERUM: 5 MG/DL (ref 3.5–7.2)
UROBILINOGEN, POC: 1
VLDLC SERPL CALC-MCNC: 18 MG/DL
WBC # BLD: 4.6 K/UL (ref 4–11)

## 2018-10-08 PROCEDURE — 99396 PREV VISIT EST AGE 40-64: CPT | Performed by: INTERNAL MEDICINE

## 2018-10-08 PROCEDURE — 81002 URINALYSIS NONAUTO W/O SCOPE: CPT | Performed by: INTERNAL MEDICINE

## 2018-10-08 PROCEDURE — 93000 ELECTROCARDIOGRAM COMPLETE: CPT | Performed by: INTERNAL MEDICINE

## 2018-10-08 ASSESSMENT — PATIENT HEALTH QUESTIONNAIRE - PHQ9
SUM OF ALL RESPONSES TO PHQ QUESTIONS 1-9: 0
1. LITTLE INTEREST OR PLEASURE IN DOING THINGS: 0
SUM OF ALL RESPONSES TO PHQ QUESTIONS 1-9: 0
2. FEELING DOWN, DEPRESSED OR HOPELESS: 0
SUM OF ALL RESPONSES TO PHQ9 QUESTIONS 1 & 2: 0

## 2018-10-08 NOTE — PROGRESS NOTES
Soft. Bowel sounds are normal. He exhibits no distension. There is no hepatosplenomegaly. There is no tenderness. There is no rebound and no guarding. Hernia confirmed negative in the right inguinal area and confirmed negative in the left inguinal area. Genitourinary: Rectum normal, prostate normal, testes normal and penis normal.   Musculoskeletal: Normal range of motion. He exhibits no edema or tenderness. Right shoulder: He exhibits normal range of motion and no tenderness. Left shoulder: He exhibits normal range of motion and no tenderness. Right elbow: He exhibits normal range of motion and no swelling. No tenderness found. Left elbow: He exhibits normal range of motion and no swelling. No tenderness found. Right wrist: He exhibits no tenderness and no swelling. Left wrist: He exhibits no tenderness and no swelling. Right hip: He exhibits normal range of motion and no tenderness. Left hip: He exhibits normal range of motion and no tenderness. Right knee: He exhibits normal range of motion and no swelling. No tenderness found. Left knee: He exhibits normal range of motion and no swelling. No tenderness found. Right ankle: He exhibits normal range of motion and no swelling. No tenderness. Left ankle: He exhibits normal range of motion and no swelling. No tenderness. Cervical back: He exhibits normal range of motion, no tenderness and no spasm. Thoracic back: He exhibits no tenderness and no spasm. Lumbar back: He exhibits normal range of motion, no tenderness and no spasm. Right upper arm: He exhibits no tenderness. Left upper arm: He exhibits no tenderness. Right forearm: He exhibits no tenderness. Left forearm: He exhibits no tenderness. Right hand: He exhibits no tenderness and no swelling. Left hand: He exhibits no tenderness and no swelling.         Right lower

## 2018-10-09 LAB
ESTIMATED AVERAGE GLUCOSE: 111.2 MG/DL
HBA1C MFR BLD: 5.5 %

## 2018-10-14 ASSESSMENT — ENCOUNTER SYMPTOMS
VOMITING: 0
ANAL BLEEDING: 0
APNEA: 0
CHOKING: 0
EYE ITCHING: 0
SHORTNESS OF BREATH: 0
WHEEZING: 0
BLOOD IN STOOL: 0
FACIAL SWELLING: 0
DIARRHEA: 0
EYE REDNESS: 0
SORE THROAT: 0
CONSTIPATION: 0
ABDOMINAL PAIN: 0
CHEST TIGHTNESS: 0
VOICE CHANGE: 0
NAUSEA: 0
BACK PAIN: 0
ABDOMINAL DISTENTION: 0
SINUS PRESSURE: 0
EYE PAIN: 0
EYE DISCHARGE: 0
RECTAL PAIN: 0
PHOTOPHOBIA: 0
RHINORRHEA: 0
COUGH: 0
TROUBLE SWALLOWING: 0

## 2018-10-31 DIAGNOSIS — E78.5 HYPERLIPIDEMIA: ICD-10-CM

## 2018-10-31 DIAGNOSIS — I10 ESSENTIAL HYPERTENSION: ICD-10-CM

## 2018-11-01 RX ORDER — AMLODIPINE BESYLATE 10 MG/1
TABLET ORAL
Qty: 90 TABLET | Refills: 1 | Status: SHIPPED | OUTPATIENT
Start: 2018-11-01 | End: 2019-05-26 | Stop reason: SDUPTHER

## 2018-11-01 RX ORDER — SIMVASTATIN 20 MG
TABLET ORAL
Qty: 90 TABLET | Refills: 1 | Status: SHIPPED | OUTPATIENT
Start: 2018-11-01 | End: 2019-05-26 | Stop reason: SDUPTHER

## 2018-12-11 DIAGNOSIS — I10 ESSENTIAL HYPERTENSION: ICD-10-CM

## 2018-12-11 DIAGNOSIS — G43.009 MIGRAINE WITHOUT AURA AND WITHOUT STATUS MIGRAINOSUS, NOT INTRACTABLE: ICD-10-CM

## 2018-12-11 RX ORDER — PROPRANOLOL HYDROCHLORIDE 10 MG/1
10 TABLET ORAL DAILY
Qty: 90 TABLET | Refills: 1 | Status: SHIPPED | OUTPATIENT
Start: 2018-12-11 | End: 2019-05-14 | Stop reason: SINTOL

## 2018-12-18 RX ORDER — CLONIDINE HYDROCHLORIDE 0.1 MG/1
TABLET ORAL
Qty: 270 TABLET | Refills: 2 | Status: SHIPPED | OUTPATIENT
Start: 2018-12-18 | End: 2019-09-16 | Stop reason: SDUPTHER

## 2019-01-18 ENCOUNTER — HOSPITAL ENCOUNTER (OUTPATIENT)
Age: 59
Discharge: HOME | End: 2019-01-18

## 2019-01-18 ENCOUNTER — HOSPITAL ENCOUNTER (OUTPATIENT)
Dept: GENERAL RADIOLOGY | Age: 59
Discharge: HOME | End: 2019-01-18

## 2019-01-18 DIAGNOSIS — Z00.6 CLINICAL TRIAL EXAM: ICD-10-CM

## 2019-01-18 PROCEDURE — 71046 X-RAY EXAM CHEST 2 VIEWS: CPT

## 2019-03-10 DIAGNOSIS — E79.0 ELEVATED BLOOD URIC ACID LEVEL: ICD-10-CM

## 2019-03-12 RX ORDER — ALLOPURINOL 300 MG/1
300 TABLET ORAL DAILY
Qty: 90 TABLET | Refills: 1 | Status: SHIPPED | OUTPATIENT
Start: 2019-03-12 | End: 2019-09-08 | Stop reason: SDUPTHER

## 2019-05-14 ENCOUNTER — OFFICE VISIT (OUTPATIENT)
Dept: INTERNAL MEDICINE CLINIC | Age: 59
End: 2019-05-14
Payer: COMMERCIAL

## 2019-05-14 VITALS
HEART RATE: 69 BPM | DIASTOLIC BLOOD PRESSURE: 74 MMHG | OXYGEN SATURATION: 99 % | TEMPERATURE: 98 F | WEIGHT: 168.2 LBS | HEIGHT: 67 IN | SYSTOLIC BLOOD PRESSURE: 136 MMHG | BODY MASS INDEX: 26.4 KG/M2

## 2019-05-14 DIAGNOSIS — E79.0 ELEVATED BLOOD URIC ACID LEVEL: ICD-10-CM

## 2019-05-14 DIAGNOSIS — G43.009 MIGRAINE WITHOUT AURA AND WITHOUT STATUS MIGRAINOSUS, NOT INTRACTABLE: ICD-10-CM

## 2019-05-14 DIAGNOSIS — E78.2 MIXED HYPERLIPIDEMIA: ICD-10-CM

## 2019-05-14 DIAGNOSIS — I10 ESSENTIAL HYPERTENSION: Primary | ICD-10-CM

## 2019-05-14 PROCEDURE — 99214 OFFICE O/P EST MOD 30 MIN: CPT | Performed by: INTERNAL MEDICINE

## 2019-05-14 RX ORDER — OLMESARTAN MEDOXOMIL AND HYDROCHLOROTHIAZIDE 20/12.5 20; 12.5 MG/1; MG/1
1 TABLET ORAL DAILY
Qty: 30 TABLET | Refills: 1 | Status: SHIPPED | OUTPATIENT
Start: 2019-05-14 | End: 2019-06-11 | Stop reason: SDUPTHER

## 2019-05-14 ASSESSMENT — PATIENT HEALTH QUESTIONNAIRE - PHQ9
1. LITTLE INTEREST OR PLEASURE IN DOING THINGS: 0
SUM OF ALL RESPONSES TO PHQ9 QUESTIONS 1 & 2: 0
SUM OF ALL RESPONSES TO PHQ QUESTIONS 1-9: 0
SUM OF ALL RESPONSES TO PHQ QUESTIONS 1-9: 0
2. FEELING DOWN, DEPRESSED OR HOPELESS: 0

## 2019-05-14 NOTE — PROGRESS NOTES
Jeremy Lora   Date ofBirth:  1960    Date of Visit:  5/14/2019    Chief Complaint   Patient presents with    Hyperlipidemia    Hypertension    Gout    Migraine       HPI  Hypertension-Pt takes Valsartan-HCTZ 160-12.5mg po q day, Clonidine 0.1mg po tid, and Amlodipine 10mg po q day. Pt monitors his BP and it averages 128/70. Pt decreases salt in his diet. Pt does elliptical 3 times per week.     Hyperlipidemia- Pt takes Simvastatin 20mg po q day. Pt denies side effects. Pt decreases fat and cholesterol in his diet.     Migraines- Pt states he has hasn't had any migraines lately. Pt states he is trying to stay from chocolate. Pt states he stopped Propranolol 2 months ago because of low heart rate.     Pt has gout. Pt states he hasn't had gout in a long time. Pt takes Allopurinol for elevated uric acid. Review of Systems   Constitutional: Negative for activity change, chills, fatigue and fever. HENT: Negative for congestion, postnasal drip, rhinorrhea and sore throat. Eyes: Negative for visual disturbance. Respiratory: Negative for cough, chest tightness, shortness of breath and wheezing. Cardiovascular: Negative for chest pain, palpitations and leg swelling. Gastrointestinal: Negative for abdominal pain, constipation, diarrhea, nausea and vomiting. Genitourinary: Negative for dysuria, frequency and hematuria. Musculoskeletal: Negative for arthralgias and myalgias. Neurological: Negative for dizziness, syncope, light-headedness, numbness and headaches. Psychiatric/Behavioral: Negative for dysphoric mood and sleep disturbance. The patient is not nervous/anxious.         Allergies   Allergen Reactions    Lisinopril Other (See Comments)     Feeling of throat swelling     Outpatient Medications Marked as Taking for the 5/14/19 encounter (Office Visit) with Ekaterina Connelly MD   Medication Sig Dispense Refill    allopurinol (ZYLOPRIM) 300 MG tablet Take 1 tablet by mouth daily 90 tablet 1    cloNIDine (CATAPRES) 0.1 MG tablet TAKE 1 TABLET THREE TIMES A  tablet 2    amLODIPine (NORVASC) 10 MG tablet TAKE 1 TABLET DAILY 90 tablet 1    simvastatin (ZOCOR) 20 MG tablet TAKE 1 TABLET NIGHTLY 90 tablet 1    valsartan-hydrochlorothiazide (DIOVAN-HCT) 160-12.5 MG per tablet Take 1 tablet by mouth daily 90 tablet 1    Multiple Vitamins-Minerals (MULTIVITAMIN GUMMIES ADULTS) CHEW Take 2 each by mouth daily           Vitals:    05/14/19 1653   BP: 136/74   Site: Right Upper Arm   Position: Sitting   Cuff Size: Large Adult   Pulse: 69   Temp: 98 °F (36.7 °C)   TempSrc: Oral   SpO2: 99%   Weight: 168 lb 3.2 oz (76.3 kg)   Height: 5' 6.75\" (1.695 m)     Body mass index is 26.54 kg/m². Physical Exam   Constitutional: He appears well-developed and well-nourished. No distress. HENT:   Mouth/Throat: Oropharynx is clear and moist and mucous membranes are normal.   Eyes: Pupils are equal, round, and reactive to light. Conjunctivae, EOM and lids are normal.   Neck: Neck supple. Carotid bruit is not present. No thyromegaly present. Cardiovascular: Normal rate, regular rhythm, S1 normal, S2 normal and normal heart sounds. Exam reveals no gallop and no friction rub. No murmur heard. Pulmonary/Chest: Effort normal and breath sounds normal. No respiratory distress. He has no wheezes. He has no rhonchi. He has no rales. Abdominal: Soft. Normal appearance and bowel sounds are normal. He exhibits no distension. There is no hepatosplenomegaly. There is no tenderness. Musculoskeletal: He exhibits no edema. Lymphadenopathy:        Head (right side): No submandibular adenopathy present. Head (left side): No submandibular adenopathy present. Psychiatric: He has a normal mood and affect. Nursing note reviewed. No results found for this visit on 05/14/19. Lab Review   No visits with results within 6 Month(s) from this visit.    Latest known visit with results is:   Orders Only on 10/08/2018   Component Date Value    WBC 10/08/2018 4.6     RBC 10/08/2018 4.35     Hemoglobin 10/08/2018 13.5     Hematocrit 10/08/2018 41.3     MCV 10/08/2018 94.8     MCH 10/08/2018 31.0     MCHC 10/08/2018 32.7     RDW 10/08/2018 16.0*    Platelets 00/73/3364 188     MPV 10/08/2018 8.5     Neutrophils % 10/08/2018 52.8     Lymphocytes % 10/08/2018 37.3     Monocytes % 10/08/2018 7.8     Eosinophils % 10/08/2018 1.6     Basophils % 10/08/2018 0.5     Neutrophils # 10/08/2018 2.4     Lymphocytes # 10/08/2018 1.7     Monocytes # 10/08/2018 0.4     Eosinophils # 10/08/2018 0.1     Basophils # 10/08/2018 0.0     Sodium 10/08/2018 138     Potassium 10/08/2018 4.6     Chloride 10/08/2018 96*    CO2 10/08/2018 28     Anion Gap 10/08/2018 14     Glucose 10/08/2018 96     BUN 10/08/2018 14     CREATININE 10/08/2018 1.2     GFR Non- 10/08/2018 >60     GFR  10/08/2018 >60     Calcium 10/08/2018 10.5     Total Protein 10/08/2018 7.4     Alb 10/08/2018 5.0     Albumin/Globulin Ratio 10/08/2018 2.1     Total Bilirubin 10/08/2018 0.7     Alkaline Phosphatase 10/08/2018 47     ALT 10/08/2018 16     AST 10/08/2018 26     Globulin 10/08/2018 2.4     Cholesterol, Total 10/08/2018 178     Triglycerides 10/08/2018 89     HDL 10/08/2018 67*    LDL Calculated 10/08/2018 93     VLDL Cholesterol Calcula* 10/08/2018 18     TSH 10/08/2018 1.08     Hemoglobin A1C 10/08/2018 5.5     eAG 10/08/2018 111.2     PSA 10/08/2018 2.74     Uric Acid, Serum 10/08/2018 5.0          Assessment/Plan     1. Essential hypertension  -stable  -Continue same medications  -Discontinue Valsartan-HCTZ due to recall issues  -Start olmesartan-hydrochlorothiazide (BENICAR HCT) 20-12.5 MG per tablet; Take 1 tablet by mouth daily  Dispense: 30 tablet; Refill: 1  -Low sodium diet  -Regular aerobic exercise    2.  Mixed hyperlipidemia  -Continue same medications  -Low fat, low cholesterol diet  -Regular aerobic exercise    3. Migraine without aura and without status migrainosus, not intractable  -stable  -no recent migraines  -avoid migraine triggers  -Patient has discontinued Propranolol due to low heart rate    4. Elevated blood uric acid level  -stable  -Continue same medications    Discussed medications with patient, who voiced understanding of their use and indications. All questions answered. Return in about 1 month (around 6/11/2019) for hypertension.

## 2019-05-22 ASSESSMENT — ENCOUNTER SYMPTOMS
VOMITING: 0
COUGH: 0
CONSTIPATION: 0
CHEST TIGHTNESS: 0
ABDOMINAL PAIN: 0
SHORTNESS OF BREATH: 0
NAUSEA: 0
DIARRHEA: 0
WHEEZING: 0
RHINORRHEA: 0
SORE THROAT: 0

## 2019-05-26 DIAGNOSIS — I10 ESSENTIAL HYPERTENSION: ICD-10-CM

## 2019-05-26 DIAGNOSIS — E78.5 HYPERLIPIDEMIA: ICD-10-CM

## 2019-05-28 RX ORDER — AMLODIPINE BESYLATE 10 MG/1
10 TABLET ORAL DAILY
Qty: 90 TABLET | Refills: 1 | Status: SHIPPED | OUTPATIENT
Start: 2019-05-28 | End: 2019-11-24 | Stop reason: SDUPTHER

## 2019-05-28 RX ORDER — SIMVASTATIN 20 MG
20 TABLET ORAL NIGHTLY
Qty: 90 TABLET | Refills: 1 | Status: SHIPPED | OUTPATIENT
Start: 2019-05-28 | End: 2019-11-24 | Stop reason: SDUPTHER

## 2019-05-28 NOTE — TELEPHONE ENCOUNTER
Medication:   Requested Prescriptions     Pending Prescriptions Disp Refills    amLODIPine (NORVASC) 10 MG tablet 90 tablet 1    simvastatin (ZOCOR) 20 MG tablet 90 tablet 1     Last Filled: 11.1.18  Last appt: 5/14/2019   Next appt: 6/11/2019    Last Lipid:   Lab Results   Component Value Date    CHOL 178 10/08/2018    TRIG 89 10/08/2018    HDL 67 10/08/2018    [address] 93 10/08/2018

## 2019-06-11 ENCOUNTER — OFFICE VISIT (OUTPATIENT)
Dept: INTERNAL MEDICINE CLINIC | Age: 59
End: 2019-06-11
Payer: COMMERCIAL

## 2019-06-11 VITALS
HEART RATE: 61 BPM | WEIGHT: 167 LBS | SYSTOLIC BLOOD PRESSURE: 110 MMHG | DIASTOLIC BLOOD PRESSURE: 66 MMHG | HEIGHT: 67 IN | BODY MASS INDEX: 26.21 KG/M2

## 2019-06-11 DIAGNOSIS — I10 ESSENTIAL HYPERTENSION: ICD-10-CM

## 2019-06-11 PROCEDURE — 99213 OFFICE O/P EST LOW 20 MIN: CPT | Performed by: INTERNAL MEDICINE

## 2019-06-11 RX ORDER — OLMESARTAN MEDOXOMIL AND HYDROCHLOROTHIAZIDE 20/12.5 20; 12.5 MG/1; MG/1
1 TABLET ORAL DAILY
Qty: 90 TABLET | Refills: 1 | Status: SHIPPED | OUTPATIENT
Start: 2019-06-11 | End: 2019-11-30 | Stop reason: SDUPTHER

## 2019-06-11 NOTE — PROGRESS NOTES
Kelly Toney   Date ofBirth:  1960    Date of Visit:  6/11/2019    Chief Complaint   Patient presents with    Hypertension       HPI  Hypertension- Pt monitors his BP and it averages 127/70. Pt decreases salt. Pt walks 3 miles daily for exercise. Review of Systems    Allergies   Allergen Reactions    Lisinopril Other (See Comments)     Feeling of throat swelling     Outpatient Medications Marked as Taking for the 6/11/19 encounter (Office Visit) with Denyce Schwab, MD   Medication Sig Dispense Refill    olmesartan-hydrochlorothiazide (BENICAR HCT) 20-12.5 MG per tablet Take 1 tablet by mouth daily 90 tablet 1    amLODIPine (NORVASC) 10 MG tablet Take 1 tablet by mouth daily 90 tablet 1    simvastatin (ZOCOR) 20 MG tablet Take 1 tablet by mouth nightly 90 tablet 1    allopurinol (ZYLOPRIM) 300 MG tablet Take 1 tablet by mouth daily 90 tablet 1    cloNIDine (CATAPRES) 0.1 MG tablet TAKE 1 TABLET THREE TIMES A  tablet 2    Multiple Vitamins-Minerals (MULTIVITAMIN GUMMIES ADULTS) CHEW Take 2 each by mouth daily           Vitals:    06/11/19 1640   BP: 110/66   Site: Left Upper Arm   Position: Sitting   Cuff Size: Large Adult   Pulse: 61   Weight: 167 lb (75.8 kg)   Height: 5' 6.75\" (1.695 m)     Body mass index is 26.35 kg/m². Physical Exam      No results found for this visit on 06/11/19. Lab Review   No visits with results within 6 Month(s) from this visit.    Latest known visit with results is:   Orders Only on 10/08/2018   Component Date Value    WBC 10/08/2018 4.6     RBC 10/08/2018 4.35     Hemoglobin 10/08/2018 13.5     Hematocrit 10/08/2018 41.3     MCV 10/08/2018 94.8     MCH 10/08/2018 31.0     MCHC 10/08/2018 32.7     RDW 10/08/2018 16.0*    Platelets 23/28/9158 188     MPV 10/08/2018 8.5     Neutrophils % 10/08/2018 52.8     Lymphocytes % 10/08/2018 37.3     Monocytes % 10/08/2018 7.8     Eosinophils % 10/08/2018 1.6     Basophils % 10/08/2018 0.5    

## 2019-09-08 DIAGNOSIS — E79.0 ELEVATED BLOOD URIC ACID LEVEL: ICD-10-CM

## 2019-09-09 RX ORDER — ALLOPURINOL 300 MG/1
TABLET ORAL
Qty: 90 TABLET | Refills: 1 | Status: SHIPPED | OUTPATIENT
Start: 2019-09-09 | End: 2020-03-09 | Stop reason: SDUPTHER

## 2019-09-16 DIAGNOSIS — I10 ESSENTIAL HYPERTENSION: Primary | ICD-10-CM

## 2019-09-16 RX ORDER — CLONIDINE HYDROCHLORIDE 0.1 MG/1
TABLET ORAL
Qty: 270 TABLET | Refills: 1 | Status: SHIPPED | OUTPATIENT
Start: 2019-09-16 | End: 2020-03-16

## 2019-10-14 ENCOUNTER — OFFICE VISIT (OUTPATIENT)
Dept: PRIMARY CARE CLINIC | Age: 59
End: 2019-10-14
Payer: COMMERCIAL

## 2019-10-14 VITALS
HEART RATE: 52 BPM | HEIGHT: 67 IN | RESPIRATION RATE: 14 BRPM | DIASTOLIC BLOOD PRESSURE: 70 MMHG | OXYGEN SATURATION: 99 % | WEIGHT: 160.2 LBS | BODY MASS INDEX: 25.15 KG/M2 | SYSTOLIC BLOOD PRESSURE: 138 MMHG | TEMPERATURE: 98.2 F

## 2019-10-14 DIAGNOSIS — E78.2 MIXED HYPERLIPIDEMIA: ICD-10-CM

## 2019-10-14 DIAGNOSIS — E79.0 ELEVATED BLOOD URIC ACID LEVEL: ICD-10-CM

## 2019-10-14 DIAGNOSIS — Z12.5 SCREENING PSA (PROSTATE SPECIFIC ANTIGEN): ICD-10-CM

## 2019-10-14 DIAGNOSIS — G43.009 MIGRAINE WITHOUT AURA AND WITHOUT STATUS MIGRAINOSUS, NOT INTRACTABLE: ICD-10-CM

## 2019-10-14 DIAGNOSIS — Z00.00 ANNUAL PHYSICAL EXAM: ICD-10-CM

## 2019-10-14 DIAGNOSIS — Z12.11 SCREENING FOR COLON CANCER: ICD-10-CM

## 2019-10-14 DIAGNOSIS — I10 ESSENTIAL HYPERTENSION: ICD-10-CM

## 2019-10-14 DIAGNOSIS — Z00.00 ANNUAL PHYSICAL EXAM: Primary | ICD-10-CM

## 2019-10-14 PROCEDURE — 99396 PREV VISIT EST AGE 40-64: CPT | Performed by: INTERNAL MEDICINE

## 2019-10-15 LAB
A/G RATIO: 1.7 (ref 1.1–2.2)
ALBUMIN SERPL-MCNC: 4.8 G/DL (ref 3.4–5)
ALP BLD-CCNC: 55 U/L (ref 40–129)
ALT SERPL-CCNC: 23 U/L (ref 10–40)
ANION GAP SERPL CALCULATED.3IONS-SCNC: 12 MMOL/L (ref 3–16)
AST SERPL-CCNC: 32 U/L (ref 15–37)
BASOPHILS ABSOLUTE: 0 K/UL (ref 0–0.2)
BASOPHILS RELATIVE PERCENT: 0.7 %
BILIRUB SERPL-MCNC: 0.6 MG/DL (ref 0–1)
BUN BLDV-MCNC: 16 MG/DL (ref 7–20)
CALCIUM SERPL-MCNC: 10.1 MG/DL (ref 8.3–10.6)
CHLORIDE BLD-SCNC: 100 MMOL/L (ref 99–110)
CHOLESTEROL, TOTAL: 162 MG/DL (ref 0–199)
CO2: 30 MMOL/L (ref 21–32)
CREAT SERPL-MCNC: 1.1 MG/DL (ref 0.8–1.3)
EOSINOPHILS ABSOLUTE: 0 K/UL (ref 0–0.6)
EOSINOPHILS RELATIVE PERCENT: 0.4 %
GFR AFRICAN AMERICAN: >60
GFR NON-AFRICAN AMERICAN: >60
GLOBULIN: 2.8 G/DL
GLUCOSE BLD-MCNC: 94 MG/DL (ref 70–99)
HCT VFR BLD CALC: 40.5 % (ref 40.5–52.5)
HDLC SERPL-MCNC: 85 MG/DL (ref 40–60)
HEMOGLOBIN: 13.5 G/DL (ref 13.5–17.5)
LDL CHOLESTEROL CALCULATED: 59 MG/DL
LYMPHOCYTES ABSOLUTE: 1.6 K/UL (ref 1–5.1)
LYMPHOCYTES RELATIVE PERCENT: 30.9 %
MCH RBC QN AUTO: 31.5 PG (ref 26–34)
MCHC RBC AUTO-ENTMCNC: 33.3 G/DL (ref 31–36)
MCV RBC AUTO: 94.7 FL (ref 80–100)
MONOCYTES ABSOLUTE: 0.3 K/UL (ref 0–1.3)
MONOCYTES RELATIVE PERCENT: 6.1 %
NEUTROPHILS ABSOLUTE: 3.1 K/UL (ref 1.7–7.7)
NEUTROPHILS RELATIVE PERCENT: 61.9 %
PDW BLD-RTO: 14.9 % (ref 12.4–15.4)
PLATELET # BLD: 176 K/UL (ref 135–450)
PMV BLD AUTO: 8.2 FL (ref 5–10.5)
POTASSIUM SERPL-SCNC: 4.2 MMOL/L (ref 3.5–5.1)
PROSTATE SPECIFIC ANTIGEN: 2.09 NG/ML (ref 0–4)
RBC # BLD: 4.28 M/UL (ref 4.2–5.9)
SODIUM BLD-SCNC: 142 MMOL/L (ref 136–145)
TOTAL PROTEIN: 7.6 G/DL (ref 6.4–8.2)
TRIGL SERPL-MCNC: 92 MG/DL (ref 0–150)
TSH SERPL DL<=0.05 MIU/L-ACNC: 1.24 UIU/ML (ref 0.27–4.2)
URIC ACID, SERUM: 3.7 MG/DL (ref 3.5–7.2)
VLDLC SERPL CALC-MCNC: 18 MG/DL
WBC # BLD: 5.1 K/UL (ref 4–11)

## 2019-10-21 ASSESSMENT — ENCOUNTER SYMPTOMS
APNEA: 0
ANAL BLEEDING: 0
RECTAL PAIN: 0
VOMITING: 0
DIARRHEA: 0
CONSTIPATION: 0
EYE ITCHING: 0
ABDOMINAL PAIN: 0
COUGH: 0
ABDOMINAL DISTENTION: 0
EYE REDNESS: 0
TROUBLE SWALLOWING: 0
SHORTNESS OF BREATH: 0
EYE DISCHARGE: 0
WHEEZING: 0
FACIAL SWELLING: 0
CHOKING: 0
SORE THROAT: 0
CHEST TIGHTNESS: 0
BACK PAIN: 0
RHINORRHEA: 0
EYE PAIN: 0
PHOTOPHOBIA: 0
BLOOD IN STOOL: 0
VOICE CHANGE: 0
NAUSEA: 0
SINUS PRESSURE: 0

## 2019-11-24 DIAGNOSIS — E78.5 HYPERLIPIDEMIA: ICD-10-CM

## 2019-11-24 DIAGNOSIS — I10 ESSENTIAL HYPERTENSION: ICD-10-CM

## 2019-11-25 RX ORDER — SIMVASTATIN 20 MG
TABLET ORAL
Qty: 90 TABLET | Refills: 1 | Status: SHIPPED | OUTPATIENT
Start: 2019-11-25 | End: 2020-04-14 | Stop reason: SDUPTHER

## 2019-11-25 RX ORDER — AMLODIPINE BESYLATE 10 MG/1
TABLET ORAL
Qty: 90 TABLET | Refills: 1 | Status: SHIPPED | OUTPATIENT
Start: 2019-11-25 | End: 2020-04-14 | Stop reason: SDUPTHER

## 2019-11-30 DIAGNOSIS — I10 ESSENTIAL HYPERTENSION: ICD-10-CM

## 2019-12-02 RX ORDER — OLMESARTAN MEDOXOMIL AND HYDROCHLOROTHIAZIDE 20/12.5 20; 12.5 MG/1; MG/1
TABLET ORAL
Qty: 90 TABLET | Refills: 1 | Status: SHIPPED | OUTPATIENT
Start: 2019-12-02 | End: 2019-12-05 | Stop reason: SDUPTHER

## 2019-12-05 DIAGNOSIS — I10 ESSENTIAL HYPERTENSION: ICD-10-CM

## 2019-12-06 RX ORDER — OLMESARTAN MEDOXOMIL AND HYDROCHLOROTHIAZIDE 20/12.5 20; 12.5 MG/1; MG/1
1 TABLET ORAL DAILY
Qty: 90 TABLET | Refills: 1 | Status: SHIPPED | OUTPATIENT
Start: 2019-12-06 | End: 2020-04-14 | Stop reason: SDUPTHER

## 2020-03-09 RX ORDER — ALLOPURINOL 300 MG/1
300 TABLET ORAL DAILY
Qty: 90 TABLET | Refills: 1 | Status: SHIPPED | OUTPATIENT
Start: 2020-03-09 | End: 2020-09-08

## 2020-03-16 RX ORDER — CLONIDINE HYDROCHLORIDE 0.1 MG/1
TABLET ORAL
Qty: 270 TABLET | Refills: 0 | Status: SHIPPED | OUTPATIENT
Start: 2020-03-16 | End: 2020-06-12

## 2020-03-16 NOTE — TELEPHONE ENCOUNTER
Medication:   Requested Prescriptions     Pending Prescriptions Disp Refills    cloNIDine (CATAPRES) 0.1 MG tablet [Pharmacy Med Name: CLONIDINE HCL TABS 0.1MG] 270 tablet 3     Sig: TAKE 1 TABLET THREE TIMES A DAY       Last Filled:  9/16/19    Patient Phone Number: [phone] (home) [phone] (work)    Last appt:  10/14/19 Physical    Next appt:  4/14/2020    Last BMP:   Lab Results   Component Value Date     10/14/2019    K 4.2 10/14/2019     10/14/2019    CO2 30 10/14/2019    ANIONGAP 12 10/14/2019    GLUCOSE 94 10/14/2019    BUN 16 10/14/2019    CREATININE 1.1 10/14/2019    LABGLOM >60 10/14/2019    GFRAA >60 10/14/2019    GFRAA >60 02/17/2012    CALCIUM 10.1 10/14/2019      Last CMP:   Lab Results   Component Value Date     10/14/2019    K 4.2 10/14/2019     10/14/2019    CO2 30 10/14/2019    ANIONGAP 12 10/14/2019    GLUCOSE 94 10/14/2019    BUN 16 10/14/2019    CREATININE 1.1 10/14/2019    LABGLOM >60 10/14/2019    GFRAA >60 10/14/2019    GFRAA >60 02/17/2012    PROT 7.6 10/14/2019    PROT 7.8 02/17/2012    LABALBU 4.8 10/14/2019    AGRATIO 1.7 10/14/2019    BILITOT 0.6 10/14/2019    ALKPHOS 55 10/14/2019    ALT 23 10/14/2019    AST 32 10/14/2019    GLOB 2.8 10/14/2019     Last Renal Function:   Lab Results   Component Value Date     10/14/2019    K 4.2 10/14/2019     10/14/2019    CO2 30 10/14/2019    GLUCOSE 94 10/14/2019    BUN 16 10/14/2019    CREATININE 1.1 10/14/2019    LABALBU 4.8 10/14/2019    CALCIUM 10.1 10/14/2019    GFR 52 02/17/2012    GFRAA >60 10/14/2019    GFRAA >60 02/17/2012       Last OARRS: No flowsheet data found.     Preferred Pharmacy:   [address], [address] [phone] - F [phone]  [place] [id number]  Phone: [phone] Fax: [address] [place] [address] Drive [address] [place] [address] - [address] [facility] [phone]  [address] [facility] [address] [facility]  Phone: [phone] Fax: [phone]    [facility] [address] [facility] [address] [place] [phone] Darryl Simmons [phone]  [address] [place] [address]  Phone: [phone] Fax: [phone]

## 2020-04-14 ENCOUNTER — VIRTUAL VISIT (OUTPATIENT)
Dept: PRIMARY CARE CLINIC | Age: 60
End: 2020-04-14
Payer: COMMERCIAL

## 2020-04-14 PROBLEM — M10.00 IDIOPATHIC GOUT: Status: ACTIVE | Noted: 2020-04-14

## 2020-04-14 PROCEDURE — 99214 OFFICE O/P EST MOD 30 MIN: CPT | Performed by: INTERNAL MEDICINE

## 2020-04-14 RX ORDER — SIMVASTATIN 20 MG
20 TABLET ORAL NIGHTLY
Qty: 90 TABLET | Refills: 1 | Status: SHIPPED | OUTPATIENT
Start: 2020-04-14 | End: 2020-10-27

## 2020-04-14 RX ORDER — OLMESARTAN MEDOXOMIL AND HYDROCHLOROTHIAZIDE 20/12.5 20; 12.5 MG/1; MG/1
1 TABLET ORAL DAILY
Qty: 90 TABLET | Refills: 1 | Status: SHIPPED | OUTPATIENT
Start: 2020-04-14 | End: 2020-11-03

## 2020-04-14 RX ORDER — AMLODIPINE BESYLATE 10 MG/1
10 TABLET ORAL DAILY
Qty: 90 TABLET | Refills: 1 | Status: SHIPPED | OUTPATIENT
Start: 2020-04-14 | End: 2020-10-27

## 2020-04-14 ASSESSMENT — ENCOUNTER SYMPTOMS
VOMITING: 0
SORE THROAT: 0
CHEST TIGHTNESS: 0
NAUSEA: 0
COUGH: 0
CONSTIPATION: 0
DIARRHEA: 0
SHORTNESS OF BREATH: 0
RHINORRHEA: 0
ABDOMINAL PAIN: 0
WHEEZING: 0

## 2020-04-14 NOTE — PROGRESS NOTES
Abnormal-      Musculoskeletal:   [] Normal gait with no signs of ataxia         [] Normal range of motion of neck        [] Abnormal-       Neurological:        [x] No Facial Asymmetry (Cranial nerve 7 motor function) (limited exam to video visit)          [] No gaze palsy        [] Abnormal-         Skin:        [x] No significant exanthematous lesions or discoloration noted on facial skin         [] Abnormal-            Psychiatric:       [x] Normal Affect [] No Hallucinations        [] Abnormal-     Other pertinent observable physical exam findings-     Lab Review   No visits with results within 6 Month(s) from this visit.    Latest known visit with results is:   Orders Only on 10/14/2019   Component Date Value    Uric Acid, Serum 10/14/2019 3.7     PSA 10/14/2019 2.09     TSH 10/14/2019 1.24     Cholesterol, Total 10/14/2019 162     Triglycerides 10/14/2019 92     HDL 10/14/2019 85*    LDL Calculated 10/14/2019 59     VLDL Cholesterol Calcula* 10/14/2019 18     Sodium 10/14/2019 142     Potassium 10/14/2019 4.2     Chloride 10/14/2019 100     CO2 10/14/2019 30     Anion Gap 10/14/2019 12     Glucose 10/14/2019 94     BUN 10/14/2019 16     CREATININE 10/14/2019 1.1     GFR Non- 10/14/2019 >60     GFR  10/14/2019 >60     Calcium 10/14/2019 10.1     Total Protein 10/14/2019 7.6     Alb 10/14/2019 4.8     Albumin/Globulin Ratio 10/14/2019 1.7     Total Bilirubin 10/14/2019 0.6     Alkaline Phosphatase 10/14/2019 55     ALT 10/14/2019 23     AST 10/14/2019 32     Globulin 10/14/2019 2.8     WBC 10/14/2019 5.1     RBC 10/14/2019 4.28     Hemoglobin 10/14/2019 13.5     Hematocrit 10/14/2019 40.5     MCV 10/14/2019 94.7     MCH 10/14/2019 31.5     MCHC 10/14/2019 33.3     RDW 10/14/2019 14.9     Platelets 04/82/4904 176     MPV 10/14/2019 8.2     Neutrophils % 10/14/2019 61.9     Lymphocytes % 10/14/2019 30.9     Monocytes % 10/14/2019 6.1     Eosinophils % 10/14/2019 0.4     Basophils % 10/14/2019 0.7     Neutrophils Absolute 10/14/2019 3.1     Lymphocytes Absolute 10/14/2019 1.6     Monocytes Absolute 10/14/2019 0.3     Eosinophils Absolute 10/14/2019 0.0     Basophils Absolute 10/14/2019 0.0        ASSESSMENT/PLAN:  1. Essential hypertension  - stable  - Continue amLODIPine (NORVASC) 10 MG tablet; Take 1 tablet by mouth daily  Dispense: 90 tablet; Refill: 1  - Continue olmesartan-hydrochlorothiazide (BENICAR HCT) 20-12.5 MG per tablet; Take 1 tablet by mouth daily  Dispense: 90 tablet; Refill: 1  -Low sodium diet  -Regular aerobic exercise  -Comprehensive Metabolic Panel; Future    2. Mixed hyperlipidemia  - controlled  - Continue simvastatin (ZOCOR) 20 MG tablet; Take 1 tablet by mouth nightly  Dispense: 90 tablet; Refill: 1  -Low fat, low cholesterol diet  -Regular aerobic exercise  - Lipid Panel; Future  - Comprehensive Metabolic Panel; Future    3. Migraine without aura and without status migrainosus, not intractable  -stable  -Continue same medications  -avoid migraine triggers    4. Idiopathic gout, unspecified chronicity, unspecified site  -stable  -Continue same medications  -avoid gout triggers      Return in about 6 months (around 10/14/2020) for annual physical exam.    Julia Leach is a 56 y.o. male being evaluated by a Virtual Visit (video visit) encounter to address concerns as mentioned above. A caregiver was present when appropriate. Due to this being a TeleHealth encounter (During [facility]-20 public health emergency), evaluation of the following organ systems was limited: Vitals/Constitutional/EENT/Resp/CV/GI//MS/Neuro/Skin/Heme-Lymph-Imm.   Pursuant to the emergency declaration under the [facility] [place], 1135 waiver authority and the [employer] Act, this Virtual Visit was conducted with patient's (and/or legal guardian's) consent, to reduce the patient's risk of exposure to COVID-19 and provide necessary medical care. The patient (and/or legal guardian) has also been advised to contact this office for worsening conditions or problems, and seek emergency medical treatment and/or call 911 if deemed necessary. Services were provided through a video synchronous discussion virtually to substitute for in-person clinic visit. Patient and provider were located at their individual homes. --oMlly Cagle MD on 4/14/2020 at 9:03 AM    An electronic signature was used to authenticate this note.

## 2020-06-12 RX ORDER — CLONIDINE HYDROCHLORIDE 0.1 MG/1
TABLET ORAL
Qty: 270 TABLET | Refills: 1 | Status: SHIPPED | OUTPATIENT
Start: 2020-06-12 | End: 2020-12-09

## 2020-06-12 NOTE — TELEPHONE ENCOUNTER
Medication:   Requested Prescriptions     Pending Prescriptions Disp Refills    cloNIDine (CATAPRES) 0.1 MG tablet [Pharmacy Med Name: CLONIDINE HCL TABS 0.1MG] 270 tablet 3     Sig: TAKE 1 TABLET THREE TIMES A DAY        Last Filled:      Patient Phone Number: [phone] (home) [phone] (work)    Last appt: 4/6/2018 04-14-20  video  Next appt: Visit date not found    Last OARRS: No flowsheet data found.     Preferred Pharmacy:   [facility] [address], [address] [phone] - F [phone]  [facility] [address]  Phone: [phone] Fax: [address] [place] [address] - [address] AND [address] [facility] [phone]  [address] [facility]  Phone: [phone] Fax: [phone]    [facility] [address], [address] [facility] [address] [place] [phone] [place] [phone]  [address] [address]  Phone: [phone] Fax: [phone]

## 2020-09-08 RX ORDER — ALLOPURINOL 300 MG/1
TABLET ORAL
Qty: 90 TABLET | Refills: 0 | Status: SHIPPED | OUTPATIENT
Start: 2020-09-08 | End: 2020-12-04

## 2020-09-08 NOTE — TELEPHONE ENCOUNTER
Medication:   Requested Prescriptions     Pending Prescriptions Disp Refills    allopurinol (ZYLOPRIM) 300 MG tablet [Pharmacy Med Name: ALLOPURINOL TABS 300MG] 90 tablet 3     Sig: TAKE 1 TABLET DAILY     Last Filled: 3.9.20    Last appt: 4/14/2020   Next appt: 11/17/2020    Last OARRS: No flowsheet data found.

## 2020-10-21 DIAGNOSIS — E78.2 MIXED HYPERLIPIDEMIA: ICD-10-CM

## 2020-10-21 DIAGNOSIS — I10 ESSENTIAL HYPERTENSION: ICD-10-CM

## 2020-10-21 LAB
A/G RATIO: 1.9 (ref 1.1–2.2)
ALBUMIN SERPL-MCNC: 5 G/DL (ref 3.4–5)
ALP BLD-CCNC: 72 U/L (ref 40–129)
ALT SERPL-CCNC: 22 U/L (ref 10–40)
ANION GAP SERPL CALCULATED.3IONS-SCNC: 13 MMOL/L (ref 3–16)
AST SERPL-CCNC: 30 U/L (ref 15–37)
BILIRUB SERPL-MCNC: 0.6 MG/DL (ref 0–1)
BUN BLDV-MCNC: 16 MG/DL (ref 7–20)
CALCIUM SERPL-MCNC: 10.3 MG/DL (ref 8.3–10.6)
CHLORIDE BLD-SCNC: 103 MMOL/L (ref 99–110)
CHOLESTEROL, TOTAL: 190 MG/DL (ref 0–199)
CO2: 28 MMOL/L (ref 21–32)
CREAT SERPL-MCNC: 1.1 MG/DL (ref 0.8–1.3)
GFR AFRICAN AMERICAN: >60
GFR NON-AFRICAN AMERICAN: >60
GLOBULIN: 2.6 G/DL
GLUCOSE BLD-MCNC: 96 MG/DL (ref 70–99)
HDLC SERPL-MCNC: 71 MG/DL (ref 40–60)
LDL CHOLESTEROL CALCULATED: 102 MG/DL
POTASSIUM SERPL-SCNC: 4 MMOL/L (ref 3.5–5.1)
SODIUM BLD-SCNC: 144 MMOL/L (ref 136–145)
TOTAL PROTEIN: 7.6 G/DL (ref 6.4–8.2)
TRIGL SERPL-MCNC: 86 MG/DL (ref 0–150)
VLDLC SERPL CALC-MCNC: 17 MG/DL

## 2020-10-27 RX ORDER — SIMVASTATIN 20 MG
TABLET ORAL
Qty: 90 TABLET | Refills: 0 | Status: SHIPPED | OUTPATIENT
Start: 2020-10-27 | End: 2021-01-25

## 2020-10-27 RX ORDER — AMLODIPINE BESYLATE 10 MG/1
TABLET ORAL
Qty: 90 TABLET | Refills: 0 | Status: SHIPPED | OUTPATIENT
Start: 2020-10-27 | End: 2021-01-25

## 2020-10-27 NOTE — TELEPHONE ENCOUNTER
Medication:   Requested Prescriptions     Pending Prescriptions Disp Refills    amLODIPine (NORVASC) 10 MG tablet [Pharmacy Med Name: AMLODIPINE BESYLATE TABS 10MG] 90 tablet 3     Sig: TAKE 1 TABLET DAILY    simvastatin (ZOCOR) 20 MG tablet [Pharmacy Med Name: SIMVASTATIN TABS 20MG] 90 tablet 3     Sig: TAKE 1 TABLET NIGHTLY     Last Filled: 4.14.20  Last appt: 4/14/2020   Next appt: 11/17/2020    Last Lipid:   Lab Results   Component Value Date    CHOL 190 10/21/2020    TRIG 86 10/21/2020    HDL 71 10/21/2020    LDLCALC 102 10/21/2020

## 2020-11-03 RX ORDER — OLMESARTAN MEDOXOMIL AND HYDROCHLOROTHIAZIDE 20/12.5 20; 12.5 MG/1; MG/1
TABLET ORAL
Qty: 90 TABLET | Refills: 0 | Status: SHIPPED | OUTPATIENT
Start: 2020-11-03 | End: 2021-02-01

## 2020-11-03 NOTE — TELEPHONE ENCOUNTER
Medication:   Requested Prescriptions     Pending Prescriptions Disp Refills    olmesartan-hydroCHLOROthiazide (BENICAR HCT) 20-12.5 MG per tablet [Pharmacy Med Name: OLMESARTAN/HCTZ TABS 20/12.5MG] 90 tablet 3     Sig: TAKE 1 TABLET DAILY     Last Filled: 4.14.20    Last appt: 4/14/2020   Next appt: 11/17/2020    Last OARRS: No flowsheet data found.

## 2020-11-17 ENCOUNTER — OFFICE VISIT (OUTPATIENT)
Dept: PRIMARY CARE CLINIC | Age: 60
End: 2020-11-17
Payer: COMMERCIAL

## 2020-11-17 VITALS
BODY MASS INDEX: 27.78 KG/M2 | HEIGHT: 67 IN | DIASTOLIC BLOOD PRESSURE: 86 MMHG | HEART RATE: 57 BPM | OXYGEN SATURATION: 98 % | RESPIRATION RATE: 16 BRPM | SYSTOLIC BLOOD PRESSURE: 138 MMHG | TEMPERATURE: 98 F | WEIGHT: 177 LBS

## 2020-11-17 DIAGNOSIS — Z00.00 ANNUAL PHYSICAL EXAM: ICD-10-CM

## 2020-11-17 DIAGNOSIS — Z12.5 SCREENING PSA (PROSTATE SPECIFIC ANTIGEN): ICD-10-CM

## 2020-11-17 DIAGNOSIS — E79.0 ELEVATED BLOOD URIC ACID LEVEL: ICD-10-CM

## 2020-11-17 DIAGNOSIS — M10.00 IDIOPATHIC GOUT, UNSPECIFIED CHRONICITY, UNSPECIFIED SITE: ICD-10-CM

## 2020-11-17 LAB
BASOPHILS ABSOLUTE: 0 K/UL (ref 0–0.2)
BASOPHILS RELATIVE PERCENT: 0.4 %
BILIRUBIN, POC: NORMAL
BLOOD URINE, POC: NORMAL
CLARITY, POC: CLEAR
COLOR, POC: YELLOW
EOSINOPHILS ABSOLUTE: 0 K/UL (ref 0–0.6)
EOSINOPHILS RELATIVE PERCENT: 0.3 %
GLUCOSE URINE, POC: NORMAL
HCT VFR BLD CALC: 40.3 % (ref 40.5–52.5)
HEMOGLOBIN: 13.7 G/DL (ref 13.5–17.5)
KETONES, POC: NORMAL
LEUKOCYTE EST, POC: NORMAL
LYMPHOCYTES ABSOLUTE: 1.5 K/UL (ref 1–5.1)
LYMPHOCYTES RELATIVE PERCENT: 26 %
MCH RBC QN AUTO: 31.6 PG (ref 26–34)
MCHC RBC AUTO-ENTMCNC: 34.1 G/DL (ref 31–36)
MCV RBC AUTO: 92.9 FL (ref 80–100)
MONOCYTES ABSOLUTE: 0.4 K/UL (ref 0–1.3)
MONOCYTES RELATIVE PERCENT: 6.3 %
NEUTROPHILS ABSOLUTE: 3.7 K/UL (ref 1.7–7.7)
NEUTROPHILS RELATIVE PERCENT: 67 %
NITRITE, POC: NORMAL
PDW BLD-RTO: 15 % (ref 12.4–15.4)
PH, POC: 5.5
PLATELET # BLD: 187 K/UL (ref 135–450)
PMV BLD AUTO: 8.2 FL (ref 5–10.5)
PROSTATE SPECIFIC ANTIGEN: 2.91 NG/ML (ref 0–4)
PROTEIN, POC: NORMAL
RBC # BLD: 4.33 M/UL (ref 4.2–5.9)
SPECIFIC GRAVITY, POC: 1.02
TSH SERPL DL<=0.05 MIU/L-ACNC: 0.7 UIU/ML (ref 0.27–4.2)
URIC ACID, SERUM: 4.7 MG/DL (ref 3.5–7.2)
UROBILINOGEN, POC: 0.2
WBC # BLD: 5.6 K/UL (ref 4–11)

## 2020-11-17 PROCEDURE — 99396 PREV VISIT EST AGE 40-64: CPT | Performed by: INTERNAL MEDICINE

## 2020-11-17 PROCEDURE — 81002 URINALYSIS NONAUTO W/O SCOPE: CPT | Performed by: INTERNAL MEDICINE

## 2020-11-17 SDOH — ECONOMIC STABILITY: FOOD INSECURITY: WITHIN THE PAST 12 MONTHS, THE FOOD YOU BOUGHT JUST DIDN'T LAST AND YOU DIDN'T HAVE MONEY TO GET MORE.: NEVER TRUE

## 2020-11-17 SDOH — ECONOMIC STABILITY: TRANSPORTATION INSECURITY
IN THE PAST 12 MONTHS, HAS THE LACK OF TRANSPORTATION KEPT YOU FROM MEDICAL APPOINTMENTS OR FROM GETTING MEDICATIONS?: NO

## 2020-11-17 SDOH — ECONOMIC STABILITY: INCOME INSECURITY: HOW HARD IS IT FOR YOU TO PAY FOR THE VERY BASICS LIKE FOOD, HOUSING, MEDICAL CARE, AND HEATING?: NOT HARD AT ALL

## 2020-11-17 SDOH — ECONOMIC STABILITY: TRANSPORTATION INSECURITY
IN THE PAST 12 MONTHS, HAS LACK OF TRANSPORTATION KEPT YOU FROM MEETINGS, WORK, OR FROM GETTING THINGS NEEDED FOR DAILY LIVING?: NO

## 2020-11-17 SDOH — ECONOMIC STABILITY: FOOD INSECURITY: WITHIN THE PAST 12 MONTHS, YOU WORRIED THAT YOUR FOOD WOULD RUN OUT BEFORE YOU GOT MONEY TO BUY MORE.: NEVER TRUE

## 2020-11-17 ASSESSMENT — ENCOUNTER SYMPTOMS
FACIAL SWELLING: 0
EYE ITCHING: 0
ANAL BLEEDING: 0
WHEEZING: 0
ABDOMINAL PAIN: 0
EYE REDNESS: 0
RHINORRHEA: 0
COUGH: 0
RECTAL PAIN: 0
SORE THROAT: 0
SHORTNESS OF BREATH: 0
BLOOD IN STOOL: 0
VOICE CHANGE: 0
PHOTOPHOBIA: 0
TROUBLE SWALLOWING: 0
APNEA: 0
CHEST TIGHTNESS: 0
ABDOMINAL DISTENTION: 0
NAUSEA: 0
VOMITING: 0
BACK PAIN: 0
DIARRHEA: 0
SINUS PAIN: 0
EYE PAIN: 0
CHOKING: 0
SINUS PRESSURE: 0
EYE DISCHARGE: 0
CONSTIPATION: 0

## 2020-11-17 ASSESSMENT — PATIENT HEALTH QUESTIONNAIRE - PHQ9
SUM OF ALL RESPONSES TO PHQ QUESTIONS 1-9: 0
1. LITTLE INTEREST OR PLEASURE IN DOING THINGS: 0
SUM OF ALL RESPONSES TO PHQ9 QUESTIONS 1 & 2: 0
SUM OF ALL RESPONSES TO PHQ QUESTIONS 1-9: 0
2. FEELING DOWN, DEPRESSED OR HOPELESS: 0
SUM OF ALL RESPONSES TO PHQ QUESTIONS 1-9: 0

## 2020-11-17 NOTE — PROGRESS NOTES
Williams Enrique   YOB: 1960    Date of Visit:  11/17/2020    Chief Complaint   Patient presents with    Annual Exam       HPI  Pt presents for annual physical exam.    Hypertension-Pt takes Olmesartan-HCTZ 20-12.5mg po q day, Clonidine 0.1mg po tid, and Amlodipine 10mg po q day. Pt decreases salt in his diet. Pt walks 4.5 miles 7 days per week.     Hyperlipidemia- Pt takes Simvastatin 20mg po qhs. Pt denies side effects. Pt decreases fat and cholesterol in his diet.      Migraines- Pt states he hasn't had a migraine in 3 months. Pt states his migraines are throbbing. Pt denies light sensitivity, noise sensitivity, nausea, and vomiting. Pt states chocolate triggers his migraines. Pt takes Advil.     Pt has gout. Pt takes Allopurinol for elevated uric acid. Pt avoids red meat and shellfish. Pt states he hasn't had any gout attacks in a long while. Review of Systems   Constitutional: Negative for activity change, appetite change, chills, diaphoresis, fatigue, fever and unexpected weight change. HENT: Negative for congestion, ear discharge, ear pain, facial swelling, hearing loss, mouth sores, nosebleeds, postnasal drip, rhinorrhea, sinus pressure, sinus pain, sneezing, sore throat, tinnitus, trouble swallowing and voice change. Eyes: Negative for photophobia, pain, discharge, redness, itching and visual disturbance. Respiratory: Negative for apnea, cough, choking, chest tightness, shortness of breath and wheezing. Cardiovascular: Negative for chest pain, palpitations and leg swelling. Gastrointestinal: Negative for abdominal distention, abdominal pain, anal bleeding, blood in stool, constipation, diarrhea, nausea, rectal pain and vomiting. Endocrine: Negative for cold intolerance and heat intolerance.    Genitourinary: Negative for decreased urine volume, difficulty urinating, discharge, dysuria, flank pain, frequency, genital sores, hematuria, penile pain, penile swelling, scrotal swelling, testicular pain and urgency. Musculoskeletal: Negative for arthralgias, back pain, gait problem, joint swelling, myalgias, neck pain and neck stiffness. Skin: Negative for rash and wound. Neurological: Negative for dizziness, tremors, seizures, syncope, facial asymmetry, speech difficulty, weakness, light-headedness, numbness and headaches. Hematological: Negative for adenopathy. Does not bruise/bleed easily. Psychiatric/Behavioral: Negative for decreased concentration, dysphoric mood and sleep disturbance. The patient is not nervous/anxious.         Past Medical History:   Diagnosis Date    Anemia 8/18/2016    Chronic kidney disease 10/15/2015    Gout     Hyperlipidemia 4/4/2014    Hypertension     Idiopathic gout     Migraine without aura and without status migrainosus, not intractable 2/15/2016       Past Surgical History:   Procedure Laterality Date    COLONOSCOPY  1/23/2009    [facility], repeat in 10 years    UPPER GASTROINTESTINAL ENDOSCOPY  1/23/2009    [facility], antral gastritis    WISDOM TOOTH EXTRACTION  as a teen    x4       Outpatient Medications Marked as Taking for the 11/17/20 encounter (Office Visit) with Elaina Sabillon MD   Medication Sig Dispense Refill    olmesartan-hydroCHLOROthiazide (BENICAR HCT) 20-12.5 MG per tablet TAKE 1 TABLET DAILY 90 tablet 0    amLODIPine (NORVASC) 10 MG tablet TAKE 1 TABLET DAILY 90 tablet 0    simvastatin (ZOCOR) 20 MG tablet TAKE 1 TABLET NIGHTLY 90 tablet 0    allopurinol (ZYLOPRIM) 300 MG tablet TAKE 1 TABLET DAILY 90 tablet 0    cloNIDine (CATAPRES) 0.1 MG tablet TAKE 1 TABLET THREE TIMES A  tablet 1    Multiple Vitamins-Minerals (MULTIVITAMIN GUMMIES ADULTS) CHEW Take 2 each by mouth daily           Allergies   Allergen Reactions    Lisinopril Other (See Comments)     Feeling of throat swelling       Social History     Tobacco Use    Smoking status: Never Smoker    Smokeless tobacco: Never Used   Substance Use Lymphadenopathy:      Head:      Right side of head: No submandibular adenopathy. Left side of head: No submandibular adenopathy. Cervical: No cervical adenopathy. Skin:     General: Skin is warm and dry. Findings: No bruising, erythema or rash. Neurological:      Mental Status: He is alert and oriented to person, place, and time. Cranial Nerves: No cranial nerve deficit. Gait: Gait normal.      Deep Tendon Reflexes: Reflexes are normal and symmetric. Babinski sign absent on the right side. Babinski sign absent on the left side. Psychiatric:         Mood and Affect: Mood normal.         Speech: Speech normal.               Results for POC orders placed in visit on 11/17/20   POCT Urinalysis no Micro   Result Value Ref Range    Color, UA yellow     Clarity, UA clear     Glucose, UA POC neg     Bilirubin, UA neg     Ketones, UA neg     Spec Grav, UA 1.025     Blood, UA POC neg     pH, UA 5.5     Protein, UA POC neg     Urobilinogen, UA 0.2     Leukocytes, UA neg     Nitrite, UA neg      Lab Review   Orders Only on 10/21/2020   Component Date Value    Sodium 10/21/2020 144     Potassium 10/21/2020 4.0     Chloride 10/21/2020 103     CO2 10/21/2020 28     Anion Gap 10/21/2020 13     Glucose 10/21/2020 96     BUN 10/21/2020 16     CREATININE 10/21/2020 1.1     GFR Non- 10/21/2020 >60     GFR  10/21/2020 >60     Calcium 10/21/2020 10.3     Total Protein 10/21/2020 7.6     Alb 10/21/2020 5.0     Albumin/Globulin Ratio 10/21/2020 1.9     Total Bilirubin 10/21/2020 0.6     Alkaline Phosphatase 10/21/2020 72     ALT 10/21/2020 22     AST 10/21/2020 30     Globulin 10/21/2020 2.6     Cholesterol, Total 10/21/2020 190     Triglycerides 10/21/2020 86     HDL 10/21/2020 71*    LDL Calculated 10/21/2020 102*    VLDL Cholesterol Calcula* 10/21/2020 17          Assessment/Plan     1.  Annual physical exam  - POCT Urinalysis no Micro  - CBC Auto Differential; Future  - TSH without Reflex; Future  - Comprehensive metabolic panel done and Lipid panel done on 10/21/20  -Colonoscopy done on 1/18/19  - Patient declines Shingrix  - Flu shot done on 10/10/20  - Tdap done on 12/20/13  - Regular aerobic exercise    2. Essential hypertension  -stable  -Continue same medications  -Low sodium diet  -Regular aerobic exercise    3. Mixed hyperlipidemia  -Continue same medications  -Low fat, low cholesterol diet  -Regular aerobic exercise    4. Elevated blood uric acid level  -stable  -Continue same medications  - Uric Acid; Future    5. Migraine without aura and without status migrainosus, not intractable  -stable  -Continue same medications  -avoid migraine triggers    6. Idiopathic gout, unspecified chronicity, unspecified site  - stable  - no gout attacks  - Uric Acid; Future    7. Screening PSA (prostate specific antigen)  - Psa screening; Future      Discussed medications with patient, who voiced understanding of their use and indications. All questions answered. Return in about 6 months (around 5/17/2021) for hypertension, hyperlipidemia, migraines, gout, .

## 2020-12-04 RX ORDER — ALLOPURINOL 300 MG/1
TABLET ORAL
Qty: 90 TABLET | Refills: 1 | Status: SHIPPED | OUTPATIENT
Start: 2020-12-04 | End: 2021-05-17 | Stop reason: SDUPTHER

## 2020-12-04 NOTE — TELEPHONE ENCOUNTER
Medication:   Requested Prescriptions     Pending Prescriptions Disp Refills    allopurinol (ZYLOPRIM) 300 MG tablet [Pharmacy Med Name: ALLOPURINOL TABS 300MG] 90 tablet 3     Sig: TAKE 1 TABLET DAILY     Last Filled: 9.8.20    Last appt: 11/17/2020   Next appt: 5/17/2021    Last OARRS: No flowsheet data found.

## 2020-12-09 RX ORDER — CLONIDINE HYDROCHLORIDE 0.1 MG/1
TABLET ORAL
Qty: 270 TABLET | Refills: 1 | Status: SHIPPED | OUTPATIENT
Start: 2020-12-09 | End: 2021-05-17 | Stop reason: SDUPTHER

## 2020-12-09 NOTE — TELEPHONE ENCOUNTER
Medication:   Requested Prescriptions     Pending Prescriptions Disp Refills    cloNIDine (CATAPRES) 0.1 MG tablet [Pharmacy Med Name: CLONIDINE HCL TABS 0.1MG] 270 tablet 3     Sig: TAKE 1 TABLET THREE TIMES A DAY     Last Filled:  6.12.20  Last appt: 11.17.20     Next appt: 5.17.21    Last OARRS: No flowsheet data found.

## 2021-01-04 NOTE — TELEPHONE ENCOUNTER
Increase physiological needs related to chronic alcohol consumption Pharmacy:  [facility] [address] [place] [address], [address] [place] [address] [phone]    Pharmacy contacted in setting of increase nutrients needs, decrease appetite, excessive alcohol intake

## 2021-01-25 DIAGNOSIS — I10 ESSENTIAL HYPERTENSION: ICD-10-CM

## 2021-01-25 DIAGNOSIS — E78.2 MIXED HYPERLIPIDEMIA: ICD-10-CM

## 2021-01-25 RX ORDER — SIMVASTATIN 20 MG
TABLET ORAL
Qty: 90 TABLET | Refills: 1 | Status: SHIPPED | OUTPATIENT
Start: 2021-01-25 | End: 2021-05-17 | Stop reason: SDUPTHER

## 2021-01-25 RX ORDER — AMLODIPINE BESYLATE 10 MG/1
TABLET ORAL
Qty: 90 TABLET | Refills: 1 | Status: SHIPPED | OUTPATIENT
Start: 2021-01-25 | End: 2021-05-17 | Stop reason: SDUPTHER

## 2021-01-25 NOTE — TELEPHONE ENCOUNTER
Medication:   Requested Prescriptions     Pending Prescriptions Disp Refills    amLODIPine (NORVASC) 10 MG tablet [Pharmacy Med Name: AMLODIPINE BESYLATE TABS 10MG] 90 tablet 3     Sig: TAKE 1 TABLET DAILY    simvastatin (ZOCOR) 20 MG tablet [Pharmacy Med Name: SIMVASTATIN TABS 20MG] 90 tablet 3     Sig: TAKE 1 TABLET NIGHTLY     Last Filled: 10.27.20 10.27.20    Last appt: 11/17/2020   Next appt: 5/17/2021    Last OARRS: No flowsheet data found.

## 2021-02-01 DIAGNOSIS — I10 ESSENTIAL HYPERTENSION: ICD-10-CM

## 2021-02-01 RX ORDER — OLMESARTAN MEDOXOMIL AND HYDROCHLOROTHIAZIDE 20/12.5 20; 12.5 MG/1; MG/1
TABLET ORAL
Qty: 90 TABLET | Refills: 1 | Status: SHIPPED | OUTPATIENT
Start: 2021-02-01 | End: 2021-05-17 | Stop reason: SDUPTHER

## 2021-02-01 NOTE — TELEPHONE ENCOUNTER
Medication:   Requested Prescriptions     Pending Prescriptions Disp Refills    olmesartan-hydroCHLOROthiazide (BENICAR HCT) 20-12.5 MG per tablet [Pharmacy Med Name: OLMESARTAN/HCTZ TABS 20/12.5MG] 90 tablet 3     Sig: TAKE 1 TABLET DAILY       Last Filled:      Patient Phone Number: [phone] (home) [phone] (work)    Last appt: 11/17/2020   Next appt: 5/17/2021    Last BMP:   Lab Results   Component Value Date     10/21/2020    K 4.0 10/21/2020     10/21/2020    CO2 28 10/21/2020    ANIONGAP 13 10/21/2020    GLUCOSE 96 10/21/2020    BUN 16 10/21/2020    CREATININE 1.1 10/21/2020    LABGLOM >60 10/21/2020    GFRAA >60 10/21/2020    GFRAA >60 02/17/2012    CALCIUM 10.3 10/21/2020      Last CMP:   Lab Results   Component Value Date     10/21/2020    K 4.0 10/21/2020     10/21/2020    CO2 28 10/21/2020    ANIONGAP 13 10/21/2020    GLUCOSE 96 10/21/2020    BUN 16 10/21/2020    CREATININE 1.1 10/21/2020    LABGLOM >60 10/21/2020    GFRAA >60 10/21/2020    GFRAA >60 02/17/2012    PROT 7.6 10/21/2020    PROT 7.8 02/17/2012    LABALBU 5.0 10/21/2020    AGRATIO 1.9 10/21/2020    BILITOT 0.6 10/21/2020    ALKPHOS 72 10/21/2020    ALT 22 10/21/2020    AST 30 10/21/2020    GLOB 2.6 10/21/2020     Last Renal Function:   Lab Results   Component Value Date     10/21/2020    K 4.0 10/21/2020     10/21/2020    CO2 28 10/21/2020    GLUCOSE 96 10/21/2020    BUN 16 10/21/2020    CREATININE 1.1 10/21/2020    LABALBU 5.0 10/21/2020    CALCIUM 10.3 10/21/2020    GFR 52 02/17/2012    GFRAA >60 10/21/2020    GFRAA >60 02/17/2012       Last OARRS: No flowsheet data found.     Preferred Pharmacy:   [address], [address] [phone] - F [phone]  [address] [place], [place] [address] [address]  Phone: [phone] Fax: [address] [place] [address] Drive [address] [place] [address], [address] [place] [address] Drive - [address] [place] [address] [phone]  [facility] [address] [facility]  Phone: [phone] Fax: [phone]    [facility] [address], [address] [facility] [address] [place] [phone] [facility] [phone]  [address] [address]  Phone: [phone] Fax: [phone]

## 2021-05-17 ENCOUNTER — OFFICE VISIT (OUTPATIENT)
Dept: PRIMARY CARE CLINIC | Age: 61
End: 2021-05-17
Payer: COMMERCIAL

## 2021-05-17 VITALS
RESPIRATION RATE: 16 BRPM | DIASTOLIC BLOOD PRESSURE: 68 MMHG | OXYGEN SATURATION: 98 % | WEIGHT: 185 LBS | SYSTOLIC BLOOD PRESSURE: 128 MMHG | BODY MASS INDEX: 29.41 KG/M2 | HEART RATE: 52 BPM | TEMPERATURE: 96.8 F

## 2021-05-17 DIAGNOSIS — I10 ESSENTIAL HYPERTENSION: Primary | ICD-10-CM

## 2021-05-17 DIAGNOSIS — E78.2 MIXED HYPERLIPIDEMIA: ICD-10-CM

## 2021-05-17 DIAGNOSIS — M10.00 IDIOPATHIC GOUT, UNSPECIFIED CHRONICITY, UNSPECIFIED SITE: ICD-10-CM

## 2021-05-17 DIAGNOSIS — I10 ESSENTIAL HYPERTENSION: ICD-10-CM

## 2021-05-17 DIAGNOSIS — E79.0 ELEVATED BLOOD URIC ACID LEVEL: ICD-10-CM

## 2021-05-17 DIAGNOSIS — G43.009 MIGRAINE WITHOUT AURA AND WITHOUT STATUS MIGRAINOSUS, NOT INTRACTABLE: ICD-10-CM

## 2021-05-17 LAB
A/G RATIO: 2.2 (ref 1.1–2.2)
ALBUMIN SERPL-MCNC: 4.6 G/DL (ref 3.4–5)
ALP BLD-CCNC: 49 U/L (ref 40–129)
ALT SERPL-CCNC: 19 U/L (ref 10–40)
ANION GAP SERPL CALCULATED.3IONS-SCNC: 11 MMOL/L (ref 3–16)
AST SERPL-CCNC: 33 U/L (ref 15–37)
BILIRUB SERPL-MCNC: <0.2 MG/DL (ref 0–1)
BUN BLDV-MCNC: 16 MG/DL (ref 7–20)
CALCIUM SERPL-MCNC: 9.9 MG/DL (ref 8.3–10.6)
CHLORIDE BLD-SCNC: 105 MMOL/L (ref 99–110)
CHOLESTEROL, TOTAL: 160 MG/DL (ref 0–199)
CO2: 27 MMOL/L (ref 21–32)
CREAT SERPL-MCNC: 1.2 MG/DL (ref 0.8–1.3)
GFR AFRICAN AMERICAN: >60
GFR NON-AFRICAN AMERICAN: >60
GLOBULIN: 2.1 G/DL
GLUCOSE BLD-MCNC: 108 MG/DL (ref 70–99)
HDLC SERPL-MCNC: 65 MG/DL (ref 40–60)
LDL CHOLESTEROL CALCULATED: 85 MG/DL
POTASSIUM SERPL-SCNC: 4.4 MMOL/L (ref 3.5–5.1)
SODIUM BLD-SCNC: 143 MMOL/L (ref 136–145)
TOTAL PROTEIN: 6.7 G/DL (ref 6.4–8.2)
TRIGL SERPL-MCNC: 49 MG/DL (ref 0–150)
URIC ACID, SERUM: 4.7 MG/DL (ref 3.5–7.2)
VLDLC SERPL CALC-MCNC: 10 MG/DL

## 2021-05-17 PROCEDURE — 99214 OFFICE O/P EST MOD 30 MIN: CPT | Performed by: INTERNAL MEDICINE

## 2021-05-17 RX ORDER — AMLODIPINE BESYLATE 10 MG/1
TABLET ORAL
Qty: 90 TABLET | Refills: 1 | Status: SHIPPED | OUTPATIENT
Start: 2021-05-17 | End: 2021-12-28

## 2021-05-17 RX ORDER — SIMVASTATIN 20 MG
TABLET ORAL
Qty: 90 TABLET | Refills: 1 | Status: SHIPPED | OUTPATIENT
Start: 2021-05-17 | End: 2021-12-28

## 2021-05-17 RX ORDER — ALLOPURINOL 300 MG/1
TABLET ORAL
Qty: 90 TABLET | Refills: 1 | Status: SHIPPED | OUTPATIENT
Start: 2021-05-17 | End: 2021-11-15

## 2021-05-17 RX ORDER — OLMESARTAN MEDOXOMIL AND HYDROCHLOROTHIAZIDE 20/12.5 20; 12.5 MG/1; MG/1
TABLET ORAL
Qty: 90 TABLET | Refills: 1 | Status: SHIPPED | OUTPATIENT
Start: 2021-05-17 | End: 2022-01-04

## 2021-05-17 RX ORDER — CLONIDINE HYDROCHLORIDE 0.1 MG/1
TABLET ORAL
Qty: 270 TABLET | Refills: 1 | Status: SHIPPED | OUTPATIENT
Start: 2021-05-17 | End: 2021-11-15

## 2021-05-17 ASSESSMENT — PATIENT HEALTH QUESTIONNAIRE - PHQ9
SUM OF ALL RESPONSES TO PHQ9 QUESTIONS 1 & 2: 0
2. FEELING DOWN, DEPRESSED OR HOPELESS: 0
SUM OF ALL RESPONSES TO PHQ QUESTIONS 1-9: 0
1. LITTLE INTEREST OR PLEASURE IN DOING THINGS: 0

## 2021-05-17 NOTE — PATIENT INSTRUCTIONS
Diagnosis Orders   1. Migraine without aura and without status migrainosus, not intractable     2. Essential hypertension  olmesartan-hydroCHLOROthiazide (BENICAR HCT) 20-12.5 MG per tablet    amLODIPine (NORVASC) 10 MG tablet    cloNIDine (CATAPRES) 0.1 MG tablet    Comprehensive Metabolic Panel   3. Mixed hyperlipidemia  simvastatin (ZOCOR) 20 MG tablet    Lipid Panel    Comprehensive Metabolic Panel   4. Elevated blood uric acid level  allopurinol (ZYLOPRIM) 300 MG tablet    Uric Acid   5.  Idiopathic gout, unspecified chronicity, unspecified site  allopurinol (ZYLOPRIM) 300 MG tablet    Uric Acid       -Low sodium diet  -Low fat, low cholesterol diet  -Regular aerobic exercise

## 2021-05-17 NOTE — PROGRESS NOTES
encounter (Office Visit) with Bia Calvo MD   Medication Sig Dispense Refill    olmesartan-hydroCHLOROthiazide (BENICAR HCT) 20-12.5 MG per tablet TAKE 1 TABLET DAILY 90 tablet 1    amLODIPine (NORVASC) 10 MG tablet TAKE 1 TABLET DAILY 90 tablet 1    simvastatin (ZOCOR) 20 MG tablet TAKE 1 TABLET NIGHTLY 90 tablet 1    cloNIDine (CATAPRES) 0.1 MG tablet TAKE 1 TABLET THREE TIMES A  tablet 1    allopurinol (ZYLOPRIM) 300 MG tablet TAKE 1 TABLET DAILY 90 tablet 1    Multiple Vitamins-Minerals (MULTIVITAMIN GUMMIES ADULTS) CHEW Take 2 each by mouth daily           Vitals:    05/17/21 0903   BP: 128/68   Site: Right Upper Arm   Position: Sitting   Cuff Size: Large Adult   Pulse: 52   Resp: 16   Temp: 96.8 °F (36 °C)   TempSrc: Infrared   SpO2: 98%   Weight: 185 lb (83.9 kg)     Body mass index is 29.41 kg/m². Physical Exam  Nursing note reviewed. Constitutional:       General: He is not in acute distress. Appearance: Normal appearance. He is well-developed. HENT:      Right Ear: Tympanic membrane and ear canal normal.      Left Ear: Tympanic membrane and ear canal normal.      Nose:      Right Sinus: No maxillary sinus tenderness. Left Sinus: No maxillary sinus tenderness. Eyes:      General: Lids are normal.      Extraocular Movements: Extraocular movements intact. Conjunctiva/sclera: Conjunctivae normal.      Pupils: Pupils are equal, round, and reactive to light. Neck:      Thyroid: No thyromegaly. Vascular: No carotid bruit. Cardiovascular:      Rate and Rhythm: Normal rate and regular rhythm. Heart sounds: Normal heart sounds, S1 normal and S2 normal. No murmur heard. No friction rub. No gallop. Pulmonary:      Effort: Pulmonary effort is normal. No respiratory distress. Breath sounds: Normal breath sounds. No wheezing, rhonchi or rales. Abdominal:      General: Bowel sounds are normal. There is no distension. Palpations: Abdomen is soft. Tenderness: There is no abdominal tenderness. Musculoskeletal:      Cervical back: Neck supple. Right lower leg: No edema. Left lower leg: No edema. Lymphadenopathy:      Head:      Right side of head: No submandibular adenopathy. Left side of head: No submandibular adenopathy. Neurological:      Mental Status: He is alert. Psychiatric:         Mood and Affect: Mood normal.           No results found for this visit on 05/17/21. Lab Review   No visits with results within 6 Month(s) from this visit. Latest known visit with results is:   Orders Only on 11/17/2020   Component Date Value    Uric Acid, Serum 11/17/2020 4.7     PSA 11/17/2020 2.91     TSH 11/17/2020 0.70     WBC 11/17/2020 5.6     RBC 11/17/2020 4.33     Hemoglobin 11/17/2020 13.7     Hematocrit 11/17/2020 40.3*    MCV 11/17/2020 92.9     MCH 11/17/2020 31.6     MCHC 11/17/2020 34.1     RDW 11/17/2020 15.0     Platelets 64/43/4308 187     MPV 11/17/2020 8.2     Neutrophils % 11/17/2020 67.0     Lymphocytes % 11/17/2020 26.0     Monocytes % 11/17/2020 6.3     Eosinophils % 11/17/2020 0.3     Basophils % 11/17/2020 0.4     Neutrophils Absolute 11/17/2020 3.7     Lymphocytes Absolute 11/17/2020 1.5     Monocytes Absolute 11/17/2020 0.4     Eosinophils Absolute 11/17/2020 0.0     Basophils Absolute 11/17/2020 0.0          Assessment/Plan     1. Essential hypertension  - stable  -continue olmesartan-hydroCHLOROthiazide (BENICAR HCT) 20-12.5 MG per tablet; TAKE 1 TABLET DAILY  Dispense: 90 tablet; Refill: 1  - continue amLODIPine (NORVASC) 10 MG tablet; TAKE 1 TABLET DAILY  Dispense: 90 tablet; Refill: 1  - continue cloNIDine (CATAPRES) 0.1 MG tablet; TAKE 1 TABLET THREE TIMES A DAY  Dispense: 270 tablet; Refill: 1  -Low sodium diet  -Regular aerobic exercise  -Comprehensive Metabolic Panel; Future    2.  Mixed hyperlipidemia  - stable  - Continue simvastatin (ZOCOR) 20 MG tablet; TAKE 1 TABLET NIGHTLY Dispense: 90 tablet; Refill: 1  -Low fat, low cholesterol diet  -Regular aerobic exercise  -Lipid Panel; Future  -Comprehensive Metabolic Panel; Future    3. Elevated blood uric acid level  - stable  - Continue allopurinol (ZYLOPRIM) 300 MG tablet; TAKE 1 TABLET DAILY  Dispense: 90 tablet; Refill: 1  - Uric Acid; Future    4. Migraine without aura and without status migrainosus, not intractable  -stable and no recent migraines  -continue same medications as needed    5. Idiopathic gout, unspecified chronicity, unspecified site  - stable  - continue allopurinol (ZYLOPRIM) 300 MG tablet; TAKE 1 TABLET DAILY  Dispense: 90 tablet; Refill: 1  - Uric Acid; Future      Discussed medications with patient, who voiced understanding of their use and indications. All questions answered.       Return in about 6 months (around 11/17/2021) for annual physical exam.

## 2021-05-21 ASSESSMENT — ENCOUNTER SYMPTOMS
CHEST TIGHTNESS: 0
NAUSEA: 0
VOMITING: 0
COUGH: 0
SORE THROAT: 0
SINUS PAIN: 0
CONSTIPATION: 0
DIARRHEA: 0
SINUS PRESSURE: 0
WHEEZING: 0
RHINORRHEA: 0
SHORTNESS OF BREATH: 0
ABDOMINAL PAIN: 0

## 2021-11-17 ENCOUNTER — OFFICE VISIT (OUTPATIENT)
Dept: PRIMARY CARE CLINIC | Age: 61
End: 2021-11-17
Payer: COMMERCIAL

## 2021-11-17 VITALS
OXYGEN SATURATION: 98 % | DIASTOLIC BLOOD PRESSURE: 80 MMHG | TEMPERATURE: 96.6 F | RESPIRATION RATE: 16 BRPM | HEIGHT: 67 IN | SYSTOLIC BLOOD PRESSURE: 136 MMHG | BODY MASS INDEX: 29.57 KG/M2 | WEIGHT: 188.4 LBS | HEART RATE: 50 BPM

## 2021-11-17 DIAGNOSIS — Z12.5 SCREENING PSA (PROSTATE SPECIFIC ANTIGEN): ICD-10-CM

## 2021-11-17 DIAGNOSIS — I10 ESSENTIAL HYPERTENSION: ICD-10-CM

## 2021-11-17 DIAGNOSIS — Z00.00 ENCOUNTER FOR WELL ADULT EXAM WITHOUT ABNORMAL FINDINGS: ICD-10-CM

## 2021-11-17 DIAGNOSIS — E79.0 ELEVATED BLOOD URIC ACID LEVEL: ICD-10-CM

## 2021-11-17 DIAGNOSIS — E78.2 MIXED HYPERLIPIDEMIA: ICD-10-CM

## 2021-11-17 DIAGNOSIS — Z13.1 SCREENING FOR DIABETES MELLITUS: ICD-10-CM

## 2021-11-17 DIAGNOSIS — G43.009 MIGRAINE WITHOUT AURA AND WITHOUT STATUS MIGRAINOSUS, NOT INTRACTABLE: ICD-10-CM

## 2021-11-17 DIAGNOSIS — M10.00 IDIOPATHIC GOUT, UNSPECIFIED CHRONICITY, UNSPECIFIED SITE: ICD-10-CM

## 2021-11-17 DIAGNOSIS — Z00.00 ENCOUNTER FOR WELL ADULT EXAM WITHOUT ABNORMAL FINDINGS: Primary | ICD-10-CM

## 2021-11-17 DIAGNOSIS — Z13.6 SCREENING FOR ISCHEMIC HEART DISEASE: ICD-10-CM

## 2021-11-17 DIAGNOSIS — D22.9 CHANGE IN MOLE: ICD-10-CM

## 2021-11-17 LAB
A/G RATIO: 2.3 (ref 1.1–2.2)
ALBUMIN SERPL-MCNC: 4.9 G/DL (ref 3.4–5)
ALP BLD-CCNC: 58 U/L (ref 40–129)
ALT SERPL-CCNC: 15 U/L (ref 10–40)
ANION GAP SERPL CALCULATED.3IONS-SCNC: 9 MMOL/L (ref 3–16)
AST SERPL-CCNC: 20 U/L (ref 15–37)
BASOPHILS ABSOLUTE: 0 K/UL (ref 0–0.2)
BASOPHILS RELATIVE PERCENT: 0.6 %
BILIRUB SERPL-MCNC: 0.3 MG/DL (ref 0–1)
BUN BLDV-MCNC: 14 MG/DL (ref 7–20)
CALCIUM SERPL-MCNC: 9.8 MG/DL (ref 8.3–10.6)
CHLORIDE BLD-SCNC: 101 MMOL/L (ref 99–110)
CHOLESTEROL, TOTAL: 191 MG/DL (ref 0–199)
CO2: 32 MMOL/L (ref 21–32)
CREAT SERPL-MCNC: 1.3 MG/DL (ref 0.8–1.3)
EOSINOPHILS ABSOLUTE: 0 K/UL (ref 0–0.6)
EOSINOPHILS RELATIVE PERCENT: 0.6 %
GFR AFRICAN AMERICAN: >60
GFR NON-AFRICAN AMERICAN: 56
GLUCOSE BLD-MCNC: 100 MG/DL (ref 70–99)
HCT VFR BLD CALC: 42 % (ref 40.5–52.5)
HDLC SERPL-MCNC: 60 MG/DL (ref 40–60)
HEMOGLOBIN: 14 G/DL (ref 13.5–17.5)
LDL CHOLESTEROL CALCULATED: 102 MG/DL
LYMPHOCYTES ABSOLUTE: 1.8 K/UL (ref 1–5.1)
LYMPHOCYTES RELATIVE PERCENT: 33.7 %
MCH RBC QN AUTO: 31.1 PG (ref 26–34)
MCHC RBC AUTO-ENTMCNC: 33.4 G/DL (ref 31–36)
MCV RBC AUTO: 93 FL (ref 80–100)
MONOCYTES ABSOLUTE: 0.4 K/UL (ref 0–1.3)
MONOCYTES RELATIVE PERCENT: 7.3 %
NEUTROPHILS ABSOLUTE: 3.2 K/UL (ref 1.7–7.7)
NEUTROPHILS RELATIVE PERCENT: 57.8 %
PDW BLD-RTO: 15 % (ref 12.4–15.4)
PLATELET # BLD: 210 K/UL (ref 135–450)
PMV BLD AUTO: 7.8 FL (ref 5–10.5)
POTASSIUM SERPL-SCNC: 4.2 MMOL/L (ref 3.5–5.1)
PROSTATE SPECIFIC ANTIGEN: 3.02 NG/ML (ref 0–4)
RBC # BLD: 4.51 M/UL (ref 4.2–5.9)
SODIUM BLD-SCNC: 142 MMOL/L (ref 136–145)
TOTAL PROTEIN: 7 G/DL (ref 6.4–8.2)
TRIGL SERPL-MCNC: 144 MG/DL (ref 0–150)
TSH SERPL DL<=0.05 MIU/L-ACNC: 1.04 UIU/ML (ref 0.27–4.2)
URIC ACID, SERUM: 4.4 MG/DL (ref 3.5–7.2)
VLDLC SERPL CALC-MCNC: 29 MG/DL
WBC # BLD: 5.5 K/UL (ref 4–11)

## 2021-11-17 PROCEDURE — 93000 ELECTROCARDIOGRAM COMPLETE: CPT | Performed by: INTERNAL MEDICINE

## 2021-11-17 PROCEDURE — 99396 PREV VISIT EST AGE 40-64: CPT | Performed by: INTERNAL MEDICINE

## 2021-11-17 NOTE — PATIENT INSTRUCTIONS
Well Visit, Men 48 to 72: Care Instructions  Overview     Well visits can help you stay healthy. Your doctor has checked your overall health and may have suggested ways to take good care of yourself. Your doctor also may have recommended tests. At home, you can help prevent illness with healthy eating, regular exercise, and other steps. Follow-up care is a key part of your treatment and safety. Be sure to make and go to all appointments, and call your doctor if you are having problems. It's also a good idea to know your test results and keep a list of the medicines you take. How can you care for yourself at home? · Get screening tests that you and your doctor decide on. Screening helps find diseases before any symptoms appear. · Eat healthy foods. Choose fruits, vegetables, whole grains, protein, and low-fat dairy foods. Limit fat, especially saturated fat. Reduce salt in your diet. · Limit alcohol. Have no more than 2 drinks a day or 14 drinks a week. · Get at least 30 minutes of exercise on most days of the week. Walking is a good choice. You also may want to do other activities, such as running, swimming, cycling, or playing tennis or team sports. · Reach and stay at a healthy weight. This will lower your risk for many problems, such as obesity, diabetes, heart disease, and high blood pressure. · Do not smoke. Smoking can make health problems worse. If you need help quitting, talk to your doctor about stop-smoking programs and medicines. These can increase your chances of quitting for good. · Care for your mental health. It is easy to get weighed down by worry and stress. Learn strategies to manage stress, like deep breathing and mindfulness, and stay connected with your family and community. If you find you often feel sad or hopeless, talk with your doctor. Treatment can help. · Talk to your doctor about whether you have any risk factors for sexually transmitted infections (STIs).  You can help prevent STIs if you wait to have sex with a new partner (or partners) until you've each been tested for STIs. It also helps if you use condoms (male or female condoms) and if you limit your sex partners to one person who only has sex with you. Vaccines are available for some STIs. · If it's important to you to prevent pregnancy with your partner, talk with your doctor about birth control options that might be best for you. · If you think you may have a problem with alcohol or drug use, talk to your doctor. This includes prescription medicines (such as amphetamines and opioids) and illegal drugs (such as cocaine and methamphetamine). Your doctor can help you figure out what type of treatment is best for you. · Protect your skin from too much sun. When you're outdoors from 10 a.m. to 4 p.m., stay in the shade or cover up with clothing and a hat with a wide brim. Wear sunglasses that block UV rays. Even when it's cloudy, put broad-spectrum sunscreen (SPF 30 or higher) on any exposed skin. · See a dentist one or two times a year for checkups and to have your teeth cleaned. · Wear a seat belt in the car. When should you call for help? Watch closely for changes in your health, and be sure to contact your doctor if you have any problems or symptoms that concern you. Where can you learn more? Go to [email or web address] and sign in to your [employer] account. Enter U347 in the [facility] box to learn more about \"Well Visit, Men 48 to 72: Care Instructions. \"     If you do not have an account, please click on the \"Sign Up Now\" link. Current as of: February 11, 2021               Content Version: 13.0  © 4421-2077 Healthwise, Incorporated. Care instructions adapted under license by [facility] ([place] [facility]).  If you have questions about a medical condition or this instruction, always ask your healthcare professional. [address]  any warranty or liability for your use of this information.

## 2021-11-17 NOTE — PROGRESS NOTES
Well Adult Note  Name: Dereje Townsend Date: 2021   MRN: [id number] Sex: Male   Age: 59 y.o. Ethnicity: Non- / Non    : 1960 Race: Black /       Hira Roach is here for well adult exam.  History:      Patient has Hypertension. Patient takes Olmesartan-HCTZ 20-12.5mg po q day, Clonidine 0.1mg po tid, and Amlodipine 10mg po q day. Patient decreases salt. Patient is not exercising.      Patient has Hyperlipidemia. Patient takes Simvastatin 20mg po qhs. Patient decreases fat and cholesterol in his diet.      Patient has migraines. Patient states he has a migraine once a month and the most recent was 2 weeks ago. Patient states migraine are throbbing. Patient takes aspirin as needed. Patient denies light sensitivity, noise sensitivity, nausea, and vomiting.      Patient has gout and denies recent gout attacks. Patient takes Allopurinol for elevated uric acid. Patient avoids red meat and shellfish.     Patient complains of mole on chest. Patient states it itches sometimes which is new. Patient denies change in color. Review of Systems   Constitutional: Negative for activity change, appetite change, chills, diaphoresis, fatigue, fever and unexpected weight change. HENT: Negative for congestion, ear discharge, ear pain, facial swelling, hearing loss, mouth sores, nosebleeds, postnasal drip, rhinorrhea, sinus pressure, sneezing, sore throat, tinnitus, trouble swallowing and voice change. Eyes: Negative for photophobia, pain, discharge, redness, itching and visual disturbance. Respiratory: Negative for apnea, cough, choking, chest tightness, shortness of breath and wheezing. Cardiovascular: Negative for chest pain, palpitations and leg swelling. Gastrointestinal: Negative for abdominal distention, abdominal pain, anal bleeding, blood in stool, constipation, diarrhea, nausea, rectal pain and vomiting.    Endocrine: Negative for cold intolerance and heat intolerance. Genitourinary: Negative for decreased urine volume, difficulty urinating, dysuria, flank pain, frequency, genital sores, hematuria, penile discharge, penile pain, penile swelling, scrotal swelling, testicular pain and urgency. Musculoskeletal: Negative for arthralgias, back pain, gait problem, joint swelling, myalgias, neck pain and neck stiffness. Skin: Negative for rash. Neurological: Positive for headaches. Negative for dizziness, tremors, seizures, syncope, facial asymmetry, speech difficulty, weakness, light-headedness and numbness. Hematological: Negative for adenopathy. Does not bruise/bleed easily. Psychiatric/Behavioral: Negative for decreased concentration, dysphoric mood and sleep disturbance. The patient is not nervous/anxious. Allergies   Allergen Reactions    Lisinopril Other (See Comments)     Feeling of throat swelling         Prior to Visit Medications    Medication Sig Taking?  Authorizing Provider   allopurinol (ZYLOPRIM) 300 MG tablet TAKE 1 TABLET DAILY Yes Walter Beltran MD   cloNIDine (CATAPRES) 0.1 MG tablet TAKE 1 TABLET THREE TIMES A DAY Yes Walter Beltran MD   olmesartan-hydroCHLOROthiazide (BENICAR HCT) 20-12.5 MG per tablet TAKE 1 TABLET DAILY Yes Walter Beltran MD   amLODIPine (NORVASC) 10 MG tablet TAKE 1 TABLET DAILY Yes Walter Beltran MD   simvastatin (ZOCOR) 20 MG tablet TAKE 1 TABLET NIGHTLY Yes Walter Beltran MD   Multiple Vitamins-Minerals (MULTIVITAMIN GUMMIES ADULTS) CHEW Take 2 each by mouth daily Yes Historical Provider, MD         Past Medical History:   Diagnosis Date    Anemia 8/18/2016    Chronic kidney disease 10/15/2015    Gout     Hyperlipidemia 4/4/2014    Hypertension     Idiopathic gout     Migraine without aura and without status migrainosus, not intractable 2/15/2016       Past Surgical History:   Procedure Laterality Date    COLONOSCOPY  1/23/2009    [facility], repeat in 10 years    UPPER GASTROINTESTINAL ENDOSCOPY  1/23/2009    [facility], antral gastritis    WISDOM TOOTH EXTRACTION  as a teen    x4         Family History   Problem Relation Age of Onset    High Blood Pressure Mother     Arthritis Mother     Cancer Father 61        lung    High Blood Pressure Sister     Heart Disease Maternal Grandmother     Heart Attack Maternal Grandmother     Other Paternal Grandmother         gall bladder complications       Social History     Tobacco Use    Smoking status: Never Smoker    Smokeless tobacco: Never Used   Vaping Use    Vaping Use: Never used   Substance Use Topics    Alcohol use: No    Drug use: No       Objective   /80   Pulse 50   Temp 96.6 °F (35.9 °C)   Resp 16   Ht 5' 6.5\" (1.689 m)   Wt 188 lb 6.4 oz (85.5 kg)   SpO2 98%   BMI 29.95 kg/m²   Wt Readings from Last 3 Encounters:   11/17/21 188 lb 6.4 oz (85.5 kg)   05/17/21 185 lb (83.9 kg)   11/17/20 177 lb (80.3 kg)     Additional Measurements    11/17/21 0911   Waist (Inches): 38.75 in         Physical Exam  Constitutional:       General: He is not in acute distress. Appearance: Normal appearance. HENT:      Head: Normocephalic and atraumatic. Right Ear: Tympanic membrane, ear canal and external ear normal.      Left Ear: Tympanic membrane, ear canal and external ear normal.      Nose: Nose normal.   Eyes:      Extraocular Movements: Extraocular movements intact. Conjunctiva/sclera: Conjunctivae normal.      Pupils: Pupils are equal, round, and reactive to light. Neck:      Thyroid: No thyromegaly. Vascular: No carotid bruit. Cardiovascular:      Rate and Rhythm: Normal rate and regular rhythm. Heart sounds: Normal heart sounds, S1 normal and S2 normal. No murmur heard. Pulmonary:      Effort: Pulmonary effort is normal.      Breath sounds: Normal breath sounds. Abdominal:      General: Bowel sounds are normal. There is no distension. Palpations: Abdomen is soft.  Sodium 05/17/2021 143     Potassium 05/17/2021 4.4     Chloride 05/17/2021 105     CO2 05/17/2021 27     Anion Gap 05/17/2021 11     Glucose 05/17/2021 108*    BUN 05/17/2021 16     CREATININE 05/17/2021 1.2     GFR Non- 05/17/2021 >60     GFR  05/17/2021 >60     Calcium 05/17/2021 9.9     Total Protein 05/17/2021 6.7     Albumin 05/17/2021 4.6     Albumin/Globulin Ratio 05/17/2021 2.2     Total Bilirubin 05/17/2021 <0.2     Alkaline Phosphatase 05/17/2021 49     ALT 05/17/2021 19     AST 05/17/2021 33     Globulin 05/17/2021 2.1        Assessment   Plan   1. Encounter for well adult exam without abnormal findings  - CBC Auto Differential; Future  - Comprehensive Metabolic Panel; Future  - Lipid Panel; Future  - TSH without Reflex; Future  -Tdap done on 12/20/13  - Flu shot done on 10/23/21  - Covid-19 vaccine J& J done on 3/6/21 and Covid booster done on 11/8/21  - Colonoscopy done on 1/18/19    2. Essential hypertension  -stable  -Continue same medications  -Low sodium diet  -Regular aerobic exercise    3. Mixed hyperlipidemia  - stable  -Continue same medications  -Low fat, low cholesterol diet  -Regular aerobic exercise  - Lipid Panel; Future    4. Migraine without aura and without status migrainosus, not intractable  -stable  -Continue same medications    5. Idiopathic gout, unspecified chronicity, unspecified site  -stable  - no gout attacks  -Continue same medications  - URIC ACID; Future    6. Elevated blood uric acid level  - stable   - no gout attacks  - URIC ACID; Future    7. Screening PSA (prostate specific antigen)  - PSA screening; Future    8. Screening for diabetes mellitus  - Hemoglobin A1C; Future    9. Change in mole  [facility]    10.  Screening for ischemic heart disease  - EKG 12 lead           Personalized Preventive Plan   Current Health Maintenance Status  Immunization History Administered Date(s) Administered    COVID-19, J&J, PF, 0.5 mL 03/06/2021    COVID-19, Moderna, Primary or Immunocompromised, PF, 100mcg/0.5mL 11/08/2021    Influenza Vaccine, unspecified formulation 10/15/2016    Influenza Virus Vaccine 10/15/2014, 10/03/2015, 10/17/2017, 10/05/2019    Tdap (Boostrix, Adacel) 12/20/2013        Health Maintenance   Topic Date Due    Shingles Vaccine (1 of 2) Never done    Lipid screen  11/17/2022    Potassium monitoring  11/17/2022    Creatinine monitoring  11/17/2022    DTaP/Tdap/Td vaccine (2 - Td or Tdap) 12/20/2023    Colon cancer screen colonoscopy  01/18/2029    Flu vaccine  Completed    COVID-19 Vaccine  Completed    Hepatitis C screen  Addressed    HIV screen  Addressed    Hepatitis A vaccine  Aged Out    Hepatitis B vaccine  Aged Out    Hib vaccine  Aged Out    Meningococcal (ACWY) vaccine  Aged Out    Pneumococcal 0-64 years Vaccine  Aged Out     Recommendations for [employer] Due: see orders and patient instructions/AVS.      Return in about 6 months (around 5/17/2022) for hypertension, hyperlipidemia, migraines, and gout.

## 2021-11-18 LAB
ESTIMATED AVERAGE GLUCOSE: 111.2 MG/DL
HBA1C MFR BLD: 5.5 %

## 2021-11-26 ASSESSMENT — ENCOUNTER SYMPTOMS
FACIAL SWELLING: 0
CONSTIPATION: 0
ABDOMINAL DISTENTION: 0
CHEST TIGHTNESS: 0
SHORTNESS OF BREATH: 0
SINUS PRESSURE: 0
ABDOMINAL PAIN: 0
EYE DISCHARGE: 0
VOICE CHANGE: 0
SORE THROAT: 0
BACK PAIN: 0
RECTAL PAIN: 0
BLOOD IN STOOL: 0
DIARRHEA: 0
EYE REDNESS: 0
NAUSEA: 0
EYE ITCHING: 0
PHOTOPHOBIA: 0
TROUBLE SWALLOWING: 0
VOMITING: 0
APNEA: 0
RHINORRHEA: 0
COUGH: 0
EYE PAIN: 0
CHOKING: 0
WHEEZING: 0
ANAL BLEEDING: 0

## 2021-12-09 ENCOUNTER — NURSE ONLY (OUTPATIENT)
Dept: PRIMARY CARE CLINIC | Age: 61
End: 2021-12-09
Payer: COMMERCIAL

## 2021-12-09 DIAGNOSIS — Z23 NEED FOR SHINGLES VACCINE: Primary | ICD-10-CM

## 2021-12-09 PROCEDURE — 90750 HZV VACC RECOMBINANT IM: CPT | Performed by: INTERNAL MEDICINE

## 2021-12-09 PROCEDURE — 90471 IMMUNIZATION ADMIN: CPT | Performed by: INTERNAL MEDICINE

## 2022-01-20 ENCOUNTER — TELEPHONE (OUTPATIENT)
Dept: PRIMARY CARE CLINIC | Age: 62
End: 2022-01-20

## 2022-01-20 NOTE — TELEPHONE ENCOUNTER
----- Message from Shellia Samples sent at 1/20/2022  3:07 PM EST -----  Subject: Message to Provider    QUESTIONS  Information for Provider? Patient is scheduling for second dose of   vaccine.  ---------------------------------------------------------------------------  --------------  CALL BACK INFO  What is the best way for the office to contact you? OK to leave message on   voicemail  Preferred Call Back Phone Number?  [phone]  ---------------------------------------------------------------------------  --------------  SCRIPT ANSWERS  undefined

## 2022-01-20 NOTE — TELEPHONE ENCOUNTER
----- Message from Naomi Brumfield sent at 1/20/2022  3:07 PM EST -----  Subject: Message to Provider    QUESTIONS  Information for Provider? Patient is scheduling for second dose of   vaccine.  ---------------------------------------------------------------------------  --------------  CALL BACK INFO  What is the best way for the office to contact you? OK to leave message on   voicemail  Preferred Call Back Phone Number?  [phone]  ---------------------------------------------------------------------------  --------------  SCRIPT ANSWERS  undefined

## 2022-02-10 ENCOUNTER — NURSE ONLY (OUTPATIENT)
Dept: PRIMARY CARE CLINIC | Age: 62
End: 2022-02-10
Payer: COMMERCIAL

## 2022-02-10 DIAGNOSIS — Z23 NEED FOR SHINGLES VACCINE: Primary | ICD-10-CM

## 2022-02-10 PROCEDURE — 90750 HZV VACC RECOMBINANT IM: CPT | Performed by: INTERNAL MEDICINE

## 2022-02-10 PROCEDURE — 90471 IMMUNIZATION ADMIN: CPT | Performed by: INTERNAL MEDICINE

## 2022-05-12 DIAGNOSIS — I10 ESSENTIAL HYPERTENSION: ICD-10-CM

## 2022-05-12 DIAGNOSIS — E79.0 ELEVATED BLOOD URIC ACID LEVEL: ICD-10-CM

## 2022-05-12 DIAGNOSIS — M10.00 IDIOPATHIC GOUT, UNSPECIFIED CHRONICITY, UNSPECIFIED SITE: ICD-10-CM

## 2022-05-12 RX ORDER — CLONIDINE HYDROCHLORIDE 0.1 MG/1
TABLET ORAL
Qty: 270 TABLET | Refills: 0 | Status: SHIPPED | OUTPATIENT
Start: 2022-05-12 | End: 2022-08-10

## 2022-05-12 RX ORDER — ALLOPURINOL 300 MG/1
TABLET ORAL
Qty: 90 TABLET | Refills: 0 | Status: SHIPPED | OUTPATIENT
Start: 2022-05-12 | End: 2022-08-10

## 2022-05-12 NOTE — TELEPHONE ENCOUNTER
Medication:   Requested Prescriptions     Pending Prescriptions Disp Refills    cloNIDine (CATAPRES) 0.1 MG tablet [Pharmacy Med Name: CLONIDINE HCL TABS 0.1MG] 270 tablet 3     Sig: TAKE 1 TABLET THREE TIMES A DAY    allopurinol (ZYLOPRIM) 300 MG tablet [Pharmacy Med Name: ALLOPURINOL TABS 300MG] 90 tablet 3     Sig: TAKE 1 TABLET DAILY     Last Filled: 11.15.21 11.15.21    Last appt: 11/17/2021   Next appt: 5/17/2022    Last OARRS: No flowsheet data found.

## 2022-05-17 ENCOUNTER — OFFICE VISIT (OUTPATIENT)
Dept: PRIMARY CARE CLINIC | Age: 62
End: 2022-05-17
Payer: COMMERCIAL

## 2022-05-17 VITALS
SYSTOLIC BLOOD PRESSURE: 128 MMHG | TEMPERATURE: 96.8 F | OXYGEN SATURATION: 98 % | BODY MASS INDEX: 28.22 KG/M2 | RESPIRATION RATE: 16 BRPM | WEIGHT: 179.8 LBS | HEIGHT: 67 IN | HEART RATE: 55 BPM | DIASTOLIC BLOOD PRESSURE: 82 MMHG

## 2022-05-17 DIAGNOSIS — E79.0 ELEVATED BLOOD URIC ACID LEVEL: ICD-10-CM

## 2022-05-17 DIAGNOSIS — E78.2 MIXED HYPERLIPIDEMIA: ICD-10-CM

## 2022-05-17 DIAGNOSIS — G43.009 MIGRAINE WITHOUT AURA AND WITHOUT STATUS MIGRAINOSUS, NOT INTRACTABLE: ICD-10-CM

## 2022-05-17 DIAGNOSIS — I10 ESSENTIAL HYPERTENSION: ICD-10-CM

## 2022-05-17 DIAGNOSIS — I10 ESSENTIAL HYPERTENSION: Primary | ICD-10-CM

## 2022-05-17 DIAGNOSIS — M10.00 IDIOPATHIC GOUT, UNSPECIFIED CHRONICITY, UNSPECIFIED SITE: ICD-10-CM

## 2022-05-17 LAB
A/G RATIO: 2 (ref 1.1–2.2)
ALBUMIN SERPL-MCNC: 4.9 G/DL (ref 3.4–5)
ALP BLD-CCNC: 61 U/L (ref 40–129)
ALT SERPL-CCNC: 15 U/L (ref 10–40)
ANION GAP SERPL CALCULATED.3IONS-SCNC: 14 MMOL/L (ref 3–16)
AST SERPL-CCNC: 24 U/L (ref 15–37)
BILIRUB SERPL-MCNC: 0.7 MG/DL (ref 0–1)
BUN BLDV-MCNC: 18 MG/DL (ref 7–20)
CALCIUM SERPL-MCNC: 10.1 MG/DL (ref 8.3–10.6)
CHLORIDE BLD-SCNC: 103 MMOL/L (ref 99–110)
CHOLESTEROL, TOTAL: 166 MG/DL (ref 0–199)
CO2: 27 MMOL/L (ref 21–32)
CREAT SERPL-MCNC: 1.2 MG/DL (ref 0.8–1.3)
GFR AFRICAN AMERICAN: >60
GFR NON-AFRICAN AMERICAN: >60
GLUCOSE BLD-MCNC: 86 MG/DL (ref 70–99)
HDLC SERPL-MCNC: 66 MG/DL (ref 40–60)
LDL CHOLESTEROL CALCULATED: 84 MG/DL
POTASSIUM SERPL-SCNC: 4 MMOL/L (ref 3.5–5.1)
SODIUM BLD-SCNC: 144 MMOL/L (ref 136–145)
TOTAL PROTEIN: 7.3 G/DL (ref 6.4–8.2)
TRIGL SERPL-MCNC: 80 MG/DL (ref 0–150)
URIC ACID, SERUM: 4.3 MG/DL (ref 3.5–7.2)
VLDLC SERPL CALC-MCNC: 16 MG/DL

## 2022-05-17 PROCEDURE — 99214 OFFICE O/P EST MOD 30 MIN: CPT | Performed by: INTERNAL MEDICINE

## 2022-05-17 SDOH — ECONOMIC STABILITY: FOOD INSECURITY: WITHIN THE PAST 12 MONTHS, YOU WORRIED THAT YOUR FOOD WOULD RUN OUT BEFORE YOU GOT MONEY TO BUY MORE.: NEVER TRUE

## 2022-05-17 SDOH — ECONOMIC STABILITY: FOOD INSECURITY: WITHIN THE PAST 12 MONTHS, THE FOOD YOU BOUGHT JUST DIDN'T LAST AND YOU DIDN'T HAVE MONEY TO GET MORE.: NEVER TRUE

## 2022-05-17 ASSESSMENT — PATIENT HEALTH QUESTIONNAIRE - PHQ9
10. IF YOU CHECKED OFF ANY PROBLEMS, HOW DIFFICULT HAVE THESE PROBLEMS MADE IT FOR YOU TO DO YOUR WORK, TAKE CARE OF THINGS AT HOME, OR GET ALONG WITH OTHER PEOPLE: 0
SUM OF ALL RESPONSES TO PHQ QUESTIONS 1-9: 3
8. MOVING OR SPEAKING SO SLOWLY THAT OTHER PEOPLE COULD HAVE NOTICED. OR THE OPPOSITE, BEING SO FIGETY OR RESTLESS THAT YOU HAVE BEEN MOVING AROUND A LOT MORE THAN USUAL: 0
2. FEELING DOWN, DEPRESSED OR HOPELESS: 0
SUM OF ALL RESPONSES TO PHQ QUESTIONS 1-9: 3
9. THOUGHTS THAT YOU WOULD BE BETTER OFF DEAD, OR OF HURTING YOURSELF: 0
3. TROUBLE FALLING OR STAYING ASLEEP: 2
SUM OF ALL RESPONSES TO PHQ9 QUESTIONS 1 & 2: 0
5. POOR APPETITE OR OVEREATING: 1
SUM OF ALL RESPONSES TO PHQ QUESTIONS 1-9: 3
SUM OF ALL RESPONSES TO PHQ QUESTIONS 1-9: 3
4. FEELING TIRED OR HAVING LITTLE ENERGY: 0
1. LITTLE INTEREST OR PLEASURE IN DOING THINGS: 0
6. FEELING BAD ABOUT YOURSELF - OR THAT YOU ARE A FAILURE OR HAVE LET YOURSELF OR YOUR FAMILY DOWN: 0
7. TROUBLE CONCENTRATING ON THINGS, SUCH AS READING THE NEWSPAPER OR WATCHING TELEVISION: 0

## 2022-05-17 ASSESSMENT — SOCIAL DETERMINANTS OF HEALTH (SDOH): HOW HARD IS IT FOR YOU TO PAY FOR THE VERY BASICS LIKE FOOD, HOUSING, MEDICAL CARE, AND HEATING?: NOT HARD AT ALL

## 2022-05-17 NOTE — PROGRESS NOTES
Maria Luz Bullock   Date ofBirth:  1960    Date of Visit:  5/17/2022    Chief Complaint   Patient presents with    Hypertension    Hyperlipidemia    Migraine    Gout       HPI  Patient has Hypertension. Patient takes Olmesartan-HCTZ 20-12.5mg once daily, clonidine 0.1mg 3 times daily, and Amlodipine 10mg once daily. Patient decreases salt. Patient does elliptical for 35 minutes 6 days per week.     Patient has Hyperlipidemia. Patient takes Simvastatin 20mg nightly. Patient decreases fat and cholesterol. Patient does elliptical for 35 minutes 6 days per week.     Patient has migraines. Patient takes ibuprofen as needed. Patient states he has a migraine once a month. Patient states his migraines have been okay. Migraines are throbbing. Patient denies light sensitivity, noise sensitivity, nausea, and vomiting with migraines. Patient states eating chocolate triggers his migraines.     Patient has gout and elevated uric acid. Patient takes allopurinol 300 mg once daily. Patient denies recent gout attacks. Patient avoids red meat and shellfish.       Review of Systems   Constitutional: Negative for activity change, chills, fatigue and fever. HENT: Negative for congestion, ear pain, postnasal drip, rhinorrhea, sinus pressure, sinus pain, sneezing and sore throat. Eyes: Negative for visual disturbance. Respiratory: Negative for cough, chest tightness, shortness of breath and wheezing. Cardiovascular: Negative for chest pain, palpitations and leg swelling. Gastrointestinal: Negative for abdominal pain, constipation, diarrhea, nausea and vomiting. Genitourinary: Negative for dysuria, frequency and hematuria. Musculoskeletal: Negative for arthralgias and myalgias. Neurological: Positive for headaches (migraines). Negative for dizziness, syncope, light-headedness and numbness. Psychiatric/Behavioral: Negative for dysphoric mood and sleep disturbance. The patient is not nervous/anxious. Allergies   Allergen Reactions    Lisinopril Other (See Comments)     Feeling of throat swelling     Outpatient Medications Marked as Taking for the 5/17/22 encounter (Office Visit) with Fauzia Buckner MD   Medication Sig Dispense Refill    cloNIDine (CATAPRES) 0.1 MG tablet TAKE 1 TABLET THREE TIMES A  tablet 0    allopurinol (ZYLOPRIM) 300 MG tablet TAKE 1 TABLET DAILY 90 tablet 0    olmesartan-hydroCHLOROthiazide (BENICAR HCT) 20-12.5 MG per tablet TAKE 1 TABLET DAILY 90 tablet 1    amLODIPine (NORVASC) 10 MG tablet TAKE 1 TABLET DAILY 90 tablet 1    simvastatin (ZOCOR) 20 MG tablet TAKE 1 TABLET NIGHTLY 90 tablet 1    Multiple Vitamins-Minerals (MULTIVITAMIN GUMMIES ADULTS) CHEW Take 2 each by mouth daily           Vitals:    05/17/22 1036   BP: 128/82   Pulse: 55   Resp: 16   Temp: 96.8 °F (36 °C)   SpO2: 98%   Weight: 179 lb 12.8 oz (81.6 kg)   Height: 5' 6.5\" (1.689 m)     Body mass index is 28.59 kg/m². Physical Exam  Nursing note reviewed. Constitutional:       General: He is not in acute distress. Appearance: Normal appearance. He is well-developed. Eyes:      General: Lids are normal.      Extraocular Movements: Extraocular movements intact. Conjunctiva/sclera: Conjunctivae normal.      Pupils: Pupils are equal, round, and reactive to light. Neck:      Thyroid: No thyromegaly. Vascular: No carotid bruit. Cardiovascular:      Rate and Rhythm: Normal rate and regular rhythm. Heart sounds: Normal heart sounds, S1 normal and S2 normal. No murmur heard. No friction rub. No gallop. Pulmonary:      Effort: Pulmonary effort is normal. No respiratory distress. Breath sounds: Normal breath sounds. No wheezing, rhonchi or rales. Abdominal:      General: Bowel sounds are normal. There is no distension. Palpations: Abdomen is soft. Tenderness: There is no abdominal tenderness. Musculoskeletal:      Cervical back: Neck supple.       Right lower leg: No edema. Left lower leg: No edema. Lymphadenopathy:      Head:      Right side of head: No submandibular adenopathy. Left side of head: No submandibular adenopathy. Neurological:      Mental Status: He is alert. Psychiatric:         Mood and Affect: Mood normal.           No results found for this visit on 05/17/22. Lab Review   No visits with results within 6 Month(s) from this visit.    Latest known visit with results is:   Orders Only on 11/17/2021   Component Date Value    Uric Acid, Serum 11/17/2021 4.4     PSA 11/17/2021 3.02     TSH 11/17/2021 1.04     Cholesterol, Total 11/17/2021 191     Triglycerides 11/17/2021 144     HDL 11/17/2021 60     LDL Calculated 11/17/2021 102*    VLDL Cholesterol Calcula* 11/17/2021 29     Hemoglobin A1C 11/17/2021 5.5     eAG 11/17/2021 111.2     Sodium 11/17/2021 142     Potassium 11/17/2021 4.2     Chloride 11/17/2021 101     CO2 11/17/2021 32     Anion Gap 11/17/2021 9     Glucose 11/17/2021 100*    BUN 11/17/2021 14     CREATININE 11/17/2021 1.3     GFR Non- 11/17/2021 56*    GFR  11/17/2021 >60     Calcium 11/17/2021 9.8     Total Protein 11/17/2021 7.0     Albumin 11/17/2021 4.9     Albumin/Globulin Ratio 11/17/2021 2.3*    Total Bilirubin 11/17/2021 0.3     Alkaline Phosphatase 11/17/2021 58     ALT 11/17/2021 15     AST 11/17/2021 20     WBC 11/17/2021 5.5     RBC 11/17/2021 4.51     Hemoglobin 11/17/2021 14.0     Hematocrit 11/17/2021 42.0     MCV 11/17/2021 93.0     MCH 11/17/2021 31.1     MCHC 11/17/2021 33.4     RDW 11/17/2021 15.0     Platelets 81/88/5726 210     MPV 11/17/2021 7.8     Neutrophils % 11/17/2021 57.8     Lymphocytes % 11/17/2021 33.7     Monocytes % 11/17/2021 7.3     Eosinophils % 11/17/2021 0.6     Basophils % 11/17/2021 0.6     Neutrophils Absolute 11/17/2021 3.2     Lymphocytes Absolute 11/17/2021 1.8     Monocytes Absolute 11/17/2021 0.4  Eosinophils Absolute 11/17/2021 0.0     Basophils Absolute 11/17/2021 0.0          Assessment/Plan     1. Essential hypertension  -stable  -Continue Olmesartan-HCTZ 20-12.5mg once daily  -Continue clonidine 0.1mg 3 times daily  -Continue amlodipine 10mg once daily   -Low sodium diet  -Regular aerobic exercise  - Comprehensive Metabolic Panel; Future    2. Mixed hyperlipidemia  -Stable  -Continue simvastatin 20 mg nightly  -Low fat, low cholesterol diet  -Regular aerobic exercise  - Comprehensive Metabolic Panel; Future  - Lipid Panel; Future    3. Migraine without aura and without status migrainosus, not intractable  -stable  -Continue ibuprofen as needed    4. Elevated blood uric acid level  -Stable  -No gout attacks  -Continue allopurinol 300 mg once daily  - Uric Acid; Future    5. Idiopathic gout, unspecified chronicity, unspecified site  -Stable  -No gout attacks  -Continue allopurinol 300 mg once daily  - Uric Acid; Future      Discussed medications with patient, who voiced understanding of their use and indications. All questions answered.       Return in about 6 months (around 11/17/2022) for annual physical exam.

## 2022-05-26 PROBLEM — I10 ESSENTIAL HYPERTENSION: Status: ACTIVE | Noted: 2022-05-26

## 2022-05-26 ASSESSMENT — ENCOUNTER SYMPTOMS
SINUS PAIN: 0
ABDOMINAL PAIN: 0
CONSTIPATION: 0
NAUSEA: 0
DIARRHEA: 0
COUGH: 0
VOMITING: 0
SHORTNESS OF BREATH: 0
SORE THROAT: 0
RHINORRHEA: 0
SINUS PRESSURE: 0
WHEEZING: 0
CHEST TIGHTNESS: 0

## 2022-06-27 DIAGNOSIS — E78.2 MIXED HYPERLIPIDEMIA: ICD-10-CM

## 2022-06-27 DIAGNOSIS — I10 ESSENTIAL HYPERTENSION: ICD-10-CM

## 2022-06-27 RX ORDER — SIMVASTATIN 20 MG
TABLET ORAL
Qty: 90 TABLET | Refills: 1 | Status: SHIPPED | OUTPATIENT
Start: 2022-06-27

## 2022-06-27 RX ORDER — AMLODIPINE BESYLATE 10 MG/1
TABLET ORAL
Qty: 90 TABLET | Refills: 1 | Status: SHIPPED | OUTPATIENT
Start: 2022-06-27

## 2022-06-27 NOTE — TELEPHONE ENCOUNTER
Medication:   Requested Prescriptions     Pending Prescriptions Disp Refills    simvastatin (ZOCOR) 20 MG tablet [Pharmacy Med Name: SIMVASTATIN TABS 20MG] 90 tablet 3     Sig: TAKE 1 TABLET NIGHTLY    amLODIPine (NORVASC) 10 MG tablet [Pharmacy Med Name: AMLODIPINE BESYLATE TABS 10MG] 90 tablet 3     Sig: TAKE 1 TABLET DAILY     Last Filled: 12.28.21 12.28.21    Last appt: 5/17/2022   Next appt: 11/17/2022    Last OARRS: No flowsheet data found.

## 2022-07-04 DIAGNOSIS — I10 ESSENTIAL HYPERTENSION: ICD-10-CM

## 2022-07-05 RX ORDER — OLMESARTAN MEDOXOMIL AND HYDROCHLOROTHIAZIDE 20/12.5 20; 12.5 MG/1; MG/1
TABLET ORAL
Qty: 90 TABLET | Refills: 1 | Status: SHIPPED | OUTPATIENT
Start: 2022-07-05 | End: 2022-08-24 | Stop reason: SDUPTHER

## 2022-07-05 NOTE — TELEPHONE ENCOUNTER
Medication:   Requested Prescriptions     Pending Prescriptions Disp Refills    olmesartan-hydroCHLOROthiazide (BENICAR HCT) 20-12.5 MG per tablet [Pharmacy Med Name: OLMESARTAN/HCTZ TABS 20/12.5MG] 90 tablet 3     Sig: TAKE 1 TABLET DAILY       Last Filled:      Patient Phone Number: [phone] (home)     Last appt: 5/17/2022   Next appt: 11/17/2022    Last BMP:   Lab Results   Component Value Date/Time     05/17/2022 11:49 AM    K 4.0 05/17/2022 11:49 AM     05/17/2022 11:49 AM    CO2 27 05/17/2022 11:49 AM    ANIONGAP 14 05/17/2022 11:49 AM    GLUCOSE 86 05/17/2022 11:49 AM    BUN 18 05/17/2022 11:49 AM    CREATININE 1.2 05/17/2022 11:49 AM    LABGLOM >60 05/17/2022 11:49 AM    GFRAA >60 05/17/2022 11:49 AM    GFRAA >60 02/17/2012 09:58 AM    CALCIUM 10.1 05/17/2022 11:49 AM      Last CMP:   Lab Results   Component Value Date/Time     05/17/2022 11:49 AM    K 4.0 05/17/2022 11:49 AM     05/17/2022 11:49 AM    CO2 27 05/17/2022 11:49 AM    ANIONGAP 14 05/17/2022 11:49 AM    GLUCOSE 86 05/17/2022 11:49 AM    BUN 18 05/17/2022 11:49 AM    CREATININE 1.2 05/17/2022 11:49 AM    LABGLOM >60 05/17/2022 11:49 AM    GFRAA >60 05/17/2022 11:49 AM    GFRAA >60 02/17/2012 09:58 AM    PROT 7.3 05/17/2022 11:49 AM    PROT 7.8 02/17/2012 09:58 AM    LABALBU 4.9 05/17/2022 11:49 AM    AGRATIO 2.0 05/17/2022 11:49 AM    BILITOT 0.7 05/17/2022 11:49 AM    ALKPHOS 61 05/17/2022 11:49 AM    ALT 15 05/17/2022 11:49 AM    AST 24 05/17/2022 11:49 AM    GLOB 2.1 05/17/2021 10:15 AM     Last Renal Function:   Lab Results   Component Value Date/Time     05/17/2022 11:49 AM    K 4.0 05/17/2022 11:49 AM     05/17/2022 11:49 AM    CO2 27 05/17/2022 11:49 AM    GLUCOSE 86 05/17/2022 11:49 AM    BUN 18 05/17/2022 11:49 AM    CREATININE 1.2 05/17/2022 11:49 AM    LABALBU 4.9 05/17/2022 11:49 AM    CALCIUM 10.1 05/17/2022 11:49 AM    GFR 52 02/17/2012 09:58 AM    GFRAA >60 05/17/2022 11:49 AM    GFRAA >60

## 2022-08-10 DIAGNOSIS — I10 ESSENTIAL HYPERTENSION: ICD-10-CM

## 2022-08-10 DIAGNOSIS — M10.00 IDIOPATHIC GOUT, UNSPECIFIED CHRONICITY, UNSPECIFIED SITE: ICD-10-CM

## 2022-08-10 DIAGNOSIS — E79.0 ELEVATED BLOOD URIC ACID LEVEL: ICD-10-CM

## 2022-08-10 RX ORDER — CLONIDINE HYDROCHLORIDE 0.1 MG/1
TABLET ORAL
Qty: 270 TABLET | Refills: 1 | Status: SHIPPED | OUTPATIENT
Start: 2022-08-10 | End: 2022-10-05 | Stop reason: SDUPTHER

## 2022-08-10 RX ORDER — ALLOPURINOL 300 MG/1
TABLET ORAL
Qty: 90 TABLET | Refills: 1 | Status: SHIPPED | OUTPATIENT
Start: 2022-08-10

## 2022-08-10 NOTE — TELEPHONE ENCOUNTER
Medication:   Requested Prescriptions     Pending Prescriptions Disp Refills    allopurinol (ZYLOPRIM) 300 MG tablet [Pharmacy Med Name: ALLOPURINOL TABS 300MG] 90 tablet 3     Sig: TAKE 1 TABLET DAILY    cloNIDine (CATAPRES) 0.1 MG tablet [Pharmacy Med Name: CLONIDINE HCL TABS 0.1MG] 270 tablet 3     Sig: TAKE 1 TABLET THREE TIMES A DAY     Last Filled: 5.12.22 5.12.22    Last appt: 5/17/2022   Next appt: 11/17/2022    Last OARRS: No flowsheet data found.

## 2022-08-24 DIAGNOSIS — I10 ESSENTIAL HYPERTENSION: ICD-10-CM

## 2022-08-24 RX ORDER — OLMESARTAN MEDOXOMIL AND HYDROCHLOROTHIAZIDE 20/12.5 20; 12.5 MG/1; MG/1
TABLET ORAL
Qty: 90 TABLET | Refills: 1 | Status: SHIPPED | OUTPATIENT
Start: 2022-08-24

## 2022-08-24 NOTE — TELEPHONE ENCOUNTER
Medication:   Requested Prescriptions     Pending Prescriptions Disp Refills    olmesartan-hydroCHLOROthiazide (BENICAR HCT) 20-12.5 MG per tablet 90 tablet 1     Last Filled: 7.5.22    Last appt: 5/17/2022   Next appt: 11/17/2022    Last OARRS: No flowsheet data found.

## 2022-10-05 DIAGNOSIS — I10 ESSENTIAL HYPERTENSION: ICD-10-CM

## 2022-10-05 RX ORDER — CLONIDINE HYDROCHLORIDE 0.1 MG/1
TABLET ORAL
Qty: 270 TABLET | Refills: 1 | Status: SHIPPED | OUTPATIENT
Start: 2022-10-05

## 2022-10-05 NOTE — TELEPHONE ENCOUNTER
Medication:   Requested Prescriptions     Pending Prescriptions Disp Refills    cloNIDine (CATAPRES) 0.1 MG tablet 270 tablet 1     Last Filled: 8.10.22    Last appt: 5/17/2022   Next appt: 11/17/2022    Last OARRS: No flowsheet data found.

## 2022-11-17 ENCOUNTER — OFFICE VISIT (OUTPATIENT)
Dept: PRIMARY CARE CLINIC | Age: 62
End: 2022-11-17
Payer: COMMERCIAL

## 2022-11-17 VITALS
BODY MASS INDEX: 28.09 KG/M2 | WEIGHT: 179 LBS | HEIGHT: 67 IN | RESPIRATION RATE: 16 BRPM | DIASTOLIC BLOOD PRESSURE: 82 MMHG | SYSTOLIC BLOOD PRESSURE: 126 MMHG | OXYGEN SATURATION: 99 % | HEART RATE: 56 BPM | TEMPERATURE: 97.7 F

## 2022-11-17 DIAGNOSIS — Z12.5 SCREENING PSA (PROSTATE SPECIFIC ANTIGEN): ICD-10-CM

## 2022-11-17 DIAGNOSIS — E78.2 MIXED HYPERLIPIDEMIA: ICD-10-CM

## 2022-11-17 DIAGNOSIS — E79.0 ELEVATED BLOOD URIC ACID LEVEL: ICD-10-CM

## 2022-11-17 DIAGNOSIS — M10.00 IDIOPATHIC GOUT, UNSPECIFIED CHRONICITY, UNSPECIFIED SITE: ICD-10-CM

## 2022-11-17 DIAGNOSIS — Z13.1 SCREENING FOR DIABETES MELLITUS: ICD-10-CM

## 2022-11-17 DIAGNOSIS — Z00.00 ENCOUNTER FOR WELL ADULT EXAM WITHOUT ABNORMAL FINDINGS: ICD-10-CM

## 2022-11-17 DIAGNOSIS — Z00.00 ENCOUNTER FOR WELL ADULT EXAM WITHOUT ABNORMAL FINDINGS: Primary | ICD-10-CM

## 2022-11-17 DIAGNOSIS — I10 ESSENTIAL HYPERTENSION: ICD-10-CM

## 2022-11-17 DIAGNOSIS — G43.009 MIGRAINE WITHOUT AURA AND WITHOUT STATUS MIGRAINOSUS, NOT INTRACTABLE: ICD-10-CM

## 2022-11-17 LAB
A/G RATIO: 1.9 (ref 1.1–2.2)
ALBUMIN SERPL-MCNC: 4.4 G/DL (ref 3.4–5)
ALP BLD-CCNC: 53 U/L (ref 40–129)
ALT SERPL-CCNC: 28 U/L (ref 10–40)
ANION GAP SERPL CALCULATED.3IONS-SCNC: 12 MMOL/L (ref 3–16)
AST SERPL-CCNC: 48 U/L (ref 15–37)
BASOPHILS ABSOLUTE: 0 K/UL (ref 0–0.2)
BASOPHILS RELATIVE PERCENT: 0.9 %
BILIRUB SERPL-MCNC: 0.5 MG/DL (ref 0–1)
BILIRUBIN URINE: NEGATIVE
BLOOD, URINE: NEGATIVE
BUN BLDV-MCNC: 21 MG/DL (ref 7–20)
CALCIUM SERPL-MCNC: 10.1 MG/DL (ref 8.3–10.6)
CHLORIDE BLD-SCNC: 101 MMOL/L (ref 99–110)
CHOLESTEROL, TOTAL: 162 MG/DL (ref 0–199)
CLARITY: CLEAR
CO2: 27 MMOL/L (ref 21–32)
COLOR: YELLOW
CREAT SERPL-MCNC: 1.3 MG/DL (ref 0.8–1.3)
EOSINOPHILS ABSOLUTE: 0 K/UL (ref 0–0.6)
EOSINOPHILS RELATIVE PERCENT: 1 %
GFR SERPL CREATININE-BSD FRML MDRD: >60 ML/MIN/{1.73_M2}
GLUCOSE BLD-MCNC: 99 MG/DL (ref 70–99)
GLUCOSE URINE: NEGATIVE MG/DL
HCT VFR BLD CALC: 40.6 % (ref 40.5–52.5)
HDLC SERPL-MCNC: 63 MG/DL (ref 40–60)
HEMOGLOBIN: 13.4 G/DL (ref 13.5–17.5)
KETONES, URINE: NEGATIVE MG/DL
LDL CHOLESTEROL CALCULATED: 83 MG/DL
LEUKOCYTE ESTERASE, URINE: NEGATIVE
LYMPHOCYTES ABSOLUTE: 1.3 K/UL (ref 1–5.1)
LYMPHOCYTES RELATIVE PERCENT: 26.7 %
MCH RBC QN AUTO: 31.3 PG (ref 26–34)
MCHC RBC AUTO-ENTMCNC: 33.1 G/DL (ref 31–36)
MCV RBC AUTO: 94.5 FL (ref 80–100)
MICROSCOPIC EXAMINATION: NORMAL
MONOCYTES ABSOLUTE: 0.4 K/UL (ref 0–1.3)
MONOCYTES RELATIVE PERCENT: 8.7 %
NEUTROPHILS ABSOLUTE: 3 K/UL (ref 1.7–7.7)
NEUTROPHILS RELATIVE PERCENT: 62.7 %
NITRITE, URINE: NEGATIVE
PDW BLD-RTO: 14.4 % (ref 12.4–15.4)
PH UA: 6 (ref 5–8)
PLATELET # BLD: 197 K/UL (ref 135–450)
PMV BLD AUTO: 8.2 FL (ref 5–10.5)
POTASSIUM SERPL-SCNC: 3.8 MMOL/L (ref 3.5–5.1)
PROSTATE SPECIFIC ANTIGEN: 3.34 NG/ML (ref 0–4)
PROTEIN UA: NEGATIVE MG/DL
RBC # BLD: 4.3 M/UL (ref 4.2–5.9)
SODIUM BLD-SCNC: 140 MMOL/L (ref 136–145)
SPECIFIC GRAVITY UA: 1.02 (ref 1–1.03)
TOTAL PROTEIN: 6.7 G/DL (ref 6.4–8.2)
TRIGL SERPL-MCNC: 80 MG/DL (ref 0–150)
TSH SERPL DL<=0.05 MIU/L-ACNC: 0.92 UIU/ML (ref 0.27–4.2)
URIC ACID, SERUM: 6.9 MG/DL (ref 3.5–7.2)
URINE TYPE: NORMAL
UROBILINOGEN, URINE: 0.2 E.U./DL
VLDLC SERPL CALC-MCNC: 16 MG/DL
WBC # BLD: 4.7 K/UL (ref 4–11)

## 2022-11-17 PROCEDURE — 99396 PREV VISIT EST AGE 40-64: CPT | Performed by: INTERNAL MEDICINE

## 2022-11-17 PROCEDURE — 3074F SYST BP LT 130 MM HG: CPT | Performed by: INTERNAL MEDICINE

## 2022-11-17 PROCEDURE — 3078F DIAST BP <80 MM HG: CPT | Performed by: INTERNAL MEDICINE

## 2022-11-17 RX ORDER — AMLODIPINE BESYLATE 10 MG/1
TABLET ORAL
Qty: 90 TABLET | Refills: 1 | Status: SHIPPED | OUTPATIENT
Start: 2022-11-17

## 2022-11-17 RX ORDER — SIMVASTATIN 20 MG
TABLET ORAL
Qty: 90 TABLET | Refills: 1 | Status: SHIPPED | OUTPATIENT
Start: 2022-11-17

## 2022-11-17 ASSESSMENT — ENCOUNTER SYMPTOMS
RHINORRHEA: 0
EYE ITCHING: 0
SINUS PRESSURE: 0
VOMITING: 0
CHOKING: 0
ANAL BLEEDING: 0
FACIAL SWELLING: 0
NAUSEA: 0
EYE DISCHARGE: 0
APNEA: 0
COUGH: 0
RECTAL PAIN: 0
SINUS PAIN: 0
EYE PAIN: 0
CHEST TIGHTNESS: 0
WHEEZING: 0
ABDOMINAL DISTENTION: 0
EYE REDNESS: 0
BACK PAIN: 0
DIARRHEA: 0
ABDOMINAL PAIN: 0
SORE THROAT: 0
SHORTNESS OF BREATH: 0
PHOTOPHOBIA: 0
CONSTIPATION: 0
TROUBLE SWALLOWING: 0
BLOOD IN STOOL: 0
VOICE CHANGE: 0

## 2022-11-17 NOTE — PATIENT INSTRUCTIONS
Well Visit, Ages 25 to 72: Care Instructions  Well visits can help you stay healthy. Your doctor has checked your overall health and may have suggested ways to take good care of yourself. Your doctor also may have recommended tests. You can help prevent illness with healthy eating, good sleep, vaccinations, regular exercise, and other steps. Get the tests that you and your doctor decide on. Depending on your age and risks, examples might include screening for diabetes; hepatitis C; HIV; and cervical, breast, lung, and colon cancer. Screening helps find diseases before any symptoms appear. Eat healthy foods. Choose fruits, vegetables, whole grains, lean protein, and low-fat dairy foods. Limit saturated fat and reduce salt. Limit alcohol. Men should have no more than 2 drinks a day. Women should have no more than 1. For some people, no alcohol is the best choice. Exercise. Get at least 30 minutes of exercise on most days of the week. Walking can be a good choice. Reach and stay at your healthy weight. This will lower your risk for many health problems. Take care of your mental health. Try to stay connected with friends, family, and community, and find ways to manage stress. If you're feeling depressed or hopeless, talk to someone. A counselor can help. If you don't have a counselor, talk to your doctor. Talk to your doctor if you think you may have a problem with alcohol or drug use. This includes prescription medicines and illegal drugs. Avoid tobacco and nicotine: Don't smoke, vape, or chew. If you need help quitting, talk to your doctor. Practice safer sex. Getting tested, using condoms or dental dams, and limiting sex partners can help prevent STIs. Use birth control if it's important to you to prevent pregnancy. Talk with your doctor about your choices and what might be best for you. Prevent problems where you can.  Protect your skin from too much sun, wash your hands, brush your teeth twice a day, and wear a seat belt in the car. Where can you learn more? Go to [email or web address] org and sign in to your [employer] account. Enter P072 in the [facility] box to learn more about \"Well Visit, Ages 25 to 72: Care Instructions. \"     If you do not have an account, please click on the \"Sign Up Now\" link. Current as of: March 9, 2022               Content Version: 13.4  © 4593-7697 Healthwise, Incorporated. Care instructions adapted under license by [facility] ([facility]). If you have questions about a medical condition or this instruction, always ask your healthcare professional. [address] any warranty or liability for your use of this information.

## 2022-11-17 NOTE — PROGRESS NOTES
Well Adult Note  Name: Mack Villarreal Date: 2022   MRN: [id number] Sex: Male   Age: 57 y.o. Ethnicity: Non- / Non    : 1960 Race: Black /       Chief Complaint   Patient presents with    Annual Exam     Wayne Becerril is here for well adult exam.  History:  Patient presents for annual physical exam.    Patient has Hypertension. Patient takes Olmesartan-HCTZ 20-12.5mg once daily, clonidine 0.1mg 3 times daily, and Amlodipine 10mg once daily. Patient decreases salt. Patient has Hyperlipidemia. Patient takes Simvastatin 20mg nightly. Patient decreases fat and cholesterol. Patient has migraines. Patient takes ibuprofen as needed. Patient states he has not had any migraines lately. Patient has gout and elevated uric acid. Patient takes allopurinol 300 mg once daily. Patient denies recent gout attacks. Patient avoids red meat and shellfish. Review of Systems   Constitutional:  Negative for activity change, appetite change, chills, diaphoresis, fatigue, fever and unexpected weight change. HENT:  Negative for congestion, ear discharge, ear pain, facial swelling, hearing loss, mouth sores, nosebleeds, postnasal drip, rhinorrhea, sinus pressure, sinus pain, sneezing, sore throat, tinnitus, trouble swallowing and voice change. Eyes:  Negative for photophobia, pain, discharge, redness, itching and visual disturbance. Respiratory:  Negative for apnea, cough, choking, chest tightness, shortness of breath and wheezing. Cardiovascular:  Negative for chest pain, palpitations and leg swelling. Gastrointestinal:  Negative for abdominal distention, abdominal pain, anal bleeding, blood in stool, constipation, diarrhea, nausea, rectal pain and vomiting. Endocrine: Negative for cold intolerance and heat intolerance.    Genitourinary:  Negative for decreased urine volume, difficulty urinating, dysuria, flank pain, frequency, genital sores, hematuria, penile discharge, penile pain, penile swelling, scrotal swelling, testicular pain and urgency. Musculoskeletal:  Negative for arthralgias, back pain, gait problem, joint swelling, myalgias, neck pain and neck stiffness. Skin:  Negative for rash and wound. Neurological:  Negative for dizziness, tremors, seizures, syncope, facial asymmetry, speech difficulty, weakness, light-headedness, numbness and headaches. Hematological:  Negative for adenopathy. Does not bruise/bleed easily. Psychiatric/Behavioral:  Negative for decreased concentration, dysphoric mood and sleep disturbance. The patient is not nervous/anxious. Allergies   Allergen Reactions    Lisinopril Other (See Comments)     Feeling of throat swelling         Prior to Visit Medications    Medication Sig Taking?  Authorizing Provider   amLODIPine (NORVASC) 10 MG tablet TAKE 1 TABLET DAILY Yes Gisela Daniels MD   simvastatin (ZOCOR) 20 MG tablet TAKE 1 TABLET NIGHTLY Yes Gisela Daniels MD   cloNIDine (CATAPRES) 0.1 MG tablet TAKE 1 TABLET THREE TIMES A DAY Yes Gisela Daniels MD   olmesartan-hydroCHLOROthiazide (BENICAR HCT) 20-12.5 MG per tablet TAKE 1 TABLET DAILY Yes Gisela Daniels MD   allopurinol (ZYLOPRIM) 300 MG tablet TAKE 1 TABLET DAILY Yes Gisela Daniels MD   Multiple Vitamins-Minerals (MULTIVITAMIN GUMMIES ADULTS) CHEW Take 2 each by mouth daily Yes Historical Provider, MD         Past Medical History:   Diagnosis Date    Anemia 8/18/2016    Chronic kidney disease 10/15/2015    Gout     Hyperlipidemia 4/4/2014    Hypertension     Idiopathic gout     Migraine without aura and without status migrainosus, not intractable 2/15/2016       Past Surgical History:   Procedure Laterality Date    COLONOSCOPY  1/23/2009    [facility], repeat in 10 years    UPPER GASTROINTESTINAL ENDOSCOPY  1/23/2009    [facility], antral gastritis    WISDOM TOOTH EXTRACTION  as a teen    x4         Family History   Problem Relation Age of Onset    High Blood Pressure Mother     Arthritis Mother     Cancer Father 61        lung    High Blood Pressure Sister     Heart Disease Maternal Grandmother     Heart Attack Maternal Grandmother     Other Paternal Grandmother         gall bladder complications       Social History     Tobacco Use    Smoking status: Never    Smokeless tobacco: Never   Vaping Use    Vaping Use: Never used   Substance Use Topics    Alcohol use: No    Drug use: No       Objective   /82   Pulse 56   Temp 97.7 °F (36.5 °C)   Resp 16   Ht 5' 6.5\" (1.689 m)   Wt 179 lb (81.2 kg)   SpO2 99%   BMI 28.46 kg/m²   Wt Readings from Last 3 Encounters:   11/17/22 179 lb (81.2 kg)   05/17/22 179 lb 12.8 oz (81.6 kg)   11/17/21 188 lb 6.4 oz (85.5 kg)     Additional Measurements    11/17/22 0755   Waist (Inches): 34 in         Physical Exam  Constitutional:       General: He is not in acute distress. Appearance: Normal appearance. HENT:      Head: Normocephalic and atraumatic. Right Ear: Tympanic membrane, ear canal and external ear normal.      Left Ear: Tympanic membrane, ear canal and external ear normal.      Nose: Nose normal.   Eyes:      Extraocular Movements: Extraocular movements intact. Conjunctiva/sclera: Conjunctivae normal.      Pupils: Pupils are equal, round, and reactive to light. Neck:      Thyroid: No thyromegaly. Vascular: No carotid bruit. Cardiovascular:      Rate and Rhythm: Normal rate and regular rhythm. Heart sounds: Normal heart sounds, S1 normal and S2 normal. No murmur heard. Pulmonary:      Effort: Pulmonary effort is normal.      Breath sounds: Normal breath sounds. Abdominal:      General: Bowel sounds are normal. There is no distension. Palpations: Abdomen is soft. Tenderness: There is no abdominal tenderness. Hernia: There is no hernia in the left inguinal area or right inguinal area.    Genitourinary:     Penis: Normal.       Testes: Normal.      Prostate: Normal. Rectum: Normal.      Comments: Chaperone for Intimate Exam  Chaperone was offered as part of the rooming process. Patient declined and agrees to continue with exam without a chaperone. Musculoskeletal:      Right shoulder: No tenderness. Normal range of motion. Left shoulder: No tenderness. Normal range of motion. Right elbow: No tenderness. Left elbow: No tenderness. Right wrist: No swelling or tenderness. Left wrist: No swelling or tenderness. Right hand: No swelling or tenderness. Left hand: No swelling or tenderness. Cervical back: Neck supple. No tenderness. Thoracic back: No tenderness. Lumbar back: No tenderness. Normal range of motion. Right hip: No tenderness. Left hip: No tenderness. Right knee: No tenderness. Left knee: No tenderness. Right lower leg: No edema. Left lower leg: No edema. Right ankle: No swelling. No tenderness. Left ankle: No swelling. No tenderness. Right foot: No swelling. Left foot: No swelling. Skin:     General: Skin is warm and dry. Neurological:      Mental Status: He is alert and oriented to person, place, and time. Deep Tendon Reflexes: Reflexes are normal and symmetric. Psychiatric:         Mood and Affect: Mood normal.         Speech: Speech normal.           Lab Review   No visits with results within 6 Month(s) from this visit.    Latest known visit with results is:   Orders Only on 05/17/2022   Component Date Value    Uric Acid, Serum 05/17/2022 4.3     Cholesterol, Total 05/17/2022 166     Triglycerides 05/17/2022 80     HDL 05/17/2022 66 (A)     LDL Calculated 05/17/2022 84     VLDL Cholesterol Calcula* 05/17/2022 16     Sodium 05/17/2022 144     Potassium 05/17/2022 4.0     Chloride 05/17/2022 103     CO2 05/17/2022 27     Anion Gap 05/17/2022 14     Glucose 05/17/2022 86     BUN 05/17/2022 18     Creatinine 05/17/2022 1.2     GFR Non- 05/17/2022 >60     GFR  05/17/2022 >60     Calcium 05/17/2022 10.1     Total Protein 05/17/2022 7.3     Albumin 05/17/2022 4.9     Albumin/Globulin Ratio 05/17/2022 2.0     Total Bilirubin 05/17/2022 0.7     Alkaline Phosphatase 05/17/2022 61     ALT 05/17/2022 15     AST 05/17/2022 24          Assessment   Plan   1. Encounter for well adult exam without abnormal findings  - CBC with Auto Differential; Future  - Comprehensive Metabolic Panel; Future  - Lipid Panel; Future  - TSH; Future  - Urinalysis; Future  -Tdap done on 12/20/13  - Flu shot done on 10/8/2022  - Covid-19 vaccine J& J done on 3/6/21 and Covid boosters done on 11/8/21 and 10/17/2022  - Colonoscopy done on 1/18/19  -Shingrix vaccine done on 12/9/2021 and 2/10/2022    2. Essential hypertension  -Stable  -Continue amLODIPine (NORVASC) 10 MG tablet; TAKE 1 TABLET DAILY  Dispense: 90 tablet; Refill: 1  -Continue Olmesartan-HCTZ 20-12.5mg once daily  -Continue clonidine 0.1mg 3 times daily  -Low sodium diet  -Regular aerobic exercise    3. Mixed hyperlipidemia  -Stable  -Continue simvastatin (ZOCOR) 20 MG tablet; TAKE 1 TABLET NIGHTLY  Dispense: 90 tablet; Refill: 1  -Low fat, low cholesterol diet  -Regular aerobic exercise    4. Elevated blood uric acid level  -Stable  -No gout attacks  -Continue allopurinol 300 mg once daily  - Uric Acid; Future    5. Idiopathic gout, unspecified chronicity, unspecified site  -Stable  -No gout attacks  -Continue allopurinol 300 mg once daily  - Uric Acid; Future    6. Migraine without aura and without status migrainosus, not intractable  -stable  -No recent migraines  -Continue ibuprofen as needed    7. Screening PSA (prostate specific antigen)  - PSA Screening; Future    8.  Screening for diabetes mellitus  - Hemoglobin A1C; Future      Personalized Preventive Plan   Current Health Maintenance Status  Immunization History   Administered Date(s) Administered    COVID-19, J&J, (age 18y+), IM, 0.5 mL 03/06/2021 COVID-19, [address] [facility] [address], Primary or Immunocompromised, (age 12y+), IM, 100 mcg/0.5mL 11/08/2021    Influenza Vaccine, unspecified formulation 10/15/2016    Influenza Virus Vaccine 10/15/2014, 10/03/2015, 10/17/2017, 10/05/2019    Tdap (Boostrix, Adacel) 12/20/2013    Zoster Recombinant (Shingrix) 12/09/2021, 02/10/2022        Health Maintenance   Topic Date Due    Lipids  05/17/2023    Depression Screen  05/17/2023    DTaP/Tdap/Td vaccine (2 - Td or Tdap) 12/20/2023    Colorectal Cancer Screen  01/18/2029    Flu vaccine  Completed    Shingles vaccine  Completed    COVID-19 Vaccine  Completed    Hepatitis C screen  Addressed    HIV screen  Addressed    Hepatitis A vaccine  Aged Out    Hib vaccine  Aged Out    Meningococcal (ACWY) vaccine  Aged Out    Pneumococcal 0-64 years Vaccine  Aged Out     Recommendations for [email or web address] Due: see orders and patient instructions/AVS.    Return in about 6 months (around 5/17/2023) for hypertension, hyperlipidemia, gout, and migraines.

## 2022-11-18 LAB
ESTIMATED AVERAGE GLUCOSE: 108.3 MG/DL
HBA1C MFR BLD: 5.4 %

## 2022-11-28 DIAGNOSIS — M10.00 IDIOPATHIC GOUT, UNSPECIFIED CHRONICITY, UNSPECIFIED SITE: ICD-10-CM

## 2022-11-28 DIAGNOSIS — E79.0 ELEVATED BLOOD URIC ACID LEVEL: ICD-10-CM

## 2022-11-28 RX ORDER — ALLOPURINOL 300 MG/1
TABLET ORAL
Qty: 90 TABLET | Refills: 1 | Status: SHIPPED | OUTPATIENT
Start: 2022-11-28

## 2022-11-28 NOTE — TELEPHONE ENCOUNTER
Medication:   Requested Prescriptions     Pending Prescriptions Disp Refills    allopurinol (ZYLOPRIM) 300 MG tablet 90 tablet 1     Last Filled:  8.10.22    Last appt: 11/17/2022   Next appt: 5/17/2023    Last OARRS: No flowsheet data found.

## 2022-12-15 DIAGNOSIS — I10 ESSENTIAL HYPERTENSION: ICD-10-CM

## 2022-12-15 DIAGNOSIS — M10.00 IDIOPATHIC GOUT, UNSPECIFIED CHRONICITY, UNSPECIFIED SITE: ICD-10-CM

## 2022-12-15 DIAGNOSIS — E79.0 ELEVATED BLOOD URIC ACID LEVEL: ICD-10-CM

## 2022-12-15 RX ORDER — CLONIDINE HYDROCHLORIDE 0.1 MG/1
TABLET ORAL
Qty: 270 TABLET | Refills: 1 | Status: SHIPPED | OUTPATIENT
Start: 2022-12-15

## 2022-12-15 RX ORDER — ALLOPURINOL 300 MG/1
TABLET ORAL
Qty: 90 TABLET | Refills: 1 | Status: SHIPPED | OUTPATIENT
Start: 2022-12-15

## 2022-12-15 RX ORDER — AMLODIPINE BESYLATE 10 MG/1
TABLET ORAL
Qty: 90 TABLET | Refills: 1 | Status: SHIPPED | OUTPATIENT
Start: 2022-12-15

## 2022-12-15 RX ORDER — OLMESARTAN MEDOXOMIL AND HYDROCHLOROTHIAZIDE 20/12.5 20; 12.5 MG/1; MG/1
TABLET ORAL
Qty: 90 TABLET | Refills: 1 | Status: SHIPPED | OUTPATIENT
Start: 2022-12-15

## 2022-12-15 NOTE — TELEPHONE ENCOUNTER
Medication:   Requested Prescriptions     Pending Prescriptions Disp Refills    olmesartan-hydroCHLOROthiazide (BENICAR HCT) 20-12.5 MG per tablet [Pharmacy Med Name: OLMESARTAN MEDOXOMIL/HYDROCHLOROTHIAZIDE 20-12.5 MG Tablet] 90 tablet 1     Sig: TAKE 1 TABLET EVERY DAY    cloNIDine (CATAPRES) 0.1 MG tablet [Pharmacy Med Name: CLONIDINE HYDROCHLORIDE 0.1 MG Tablet] 270 tablet 1     Sig: TAKE 1 TABLET THREE TIMES DAILY    amLODIPine (NORVASC) 10 MG tablet [Pharmacy Med Name: AMLODIPINE BESYLATE 10 MG Tablet] 90 tablet 1     Sig: TAKE 1 TABLET EVERY DAY    allopurinol (ZYLOPRIM) 300 MG tablet [Pharmacy Med Name: ALLOPURINOL 300 MG Tablet] 90 tablet 1     Sig: TAKE 1 TABLET EVERY DAY     Last Filled:  8.24.22 10.5.22 11.17.22 11.28.22    Last appt: 11/17/2022   Next appt: 5/17/2023    Last OARRS: No flowsheet data found.

## 2022-12-16 DIAGNOSIS — E78.2 MIXED HYPERLIPIDEMIA: ICD-10-CM

## 2022-12-16 RX ORDER — SIMVASTATIN 20 MG
TABLET ORAL
Qty: 90 TABLET | Refills: 1 | Status: SHIPPED | OUTPATIENT
Start: 2022-12-16

## 2022-12-16 NOTE — TELEPHONE ENCOUNTER
Medication:   Requested Prescriptions     Pending Prescriptions Disp Refills    simvastatin (ZOCOR) 20 MG tablet 90 tablet 1     Sig: TAKE 1 TABLET NIGHTLY     Last Filled:  11/17/22    Last appt: 11/17/2022   Next appt: 5/17/2023    Last Lipid:   Lab Results   Component Value Date/Time    CHOL 162 11/17/2022 08:30 AM    TRIG 80 11/17/2022 08:30 AM    HDL 63 11/17/2022 08:30 AM    LDLCALC 83 11/17/2022 08:30 AM

## 2023-05-09 SDOH — ECONOMIC STABILITY: FOOD INSECURITY: WITHIN THE PAST 12 MONTHS, THE FOOD YOU BOUGHT JUST DIDN'T LAST AND YOU DIDN'T HAVE MONEY TO GET MORE.: NEVER TRUE

## 2023-05-09 SDOH — ECONOMIC STABILITY: HOUSING INSECURITY
IN THE LAST 12 MONTHS, WAS THERE A TIME WHEN YOU DID NOT HAVE A STEADY PLACE TO SLEEP OR SLEPT IN A SHELTER (INCLUDING NOW)?: NO

## 2023-05-09 SDOH — ECONOMIC STABILITY: INCOME INSECURITY: HOW HARD IS IT FOR YOU TO PAY FOR THE VERY BASICS LIKE FOOD, HOUSING, MEDICAL CARE, AND HEATING?: NOT HARD AT ALL

## 2023-05-09 SDOH — ECONOMIC STABILITY: FOOD INSECURITY: WITHIN THE PAST 12 MONTHS, YOU WORRIED THAT YOUR FOOD WOULD RUN OUT BEFORE YOU GOT MONEY TO BUY MORE.: NEVER TRUE

## 2023-05-10 ENCOUNTER — OFFICE VISIT (OUTPATIENT)
Dept: PRIMARY CARE CLINIC | Age: 63
End: 2023-05-10
Payer: COMMERCIAL

## 2023-05-10 VITALS
SYSTOLIC BLOOD PRESSURE: 112 MMHG | HEIGHT: 67 IN | BODY MASS INDEX: 25.58 KG/M2 | WEIGHT: 163 LBS | OXYGEN SATURATION: 99 % | HEART RATE: 66 BPM | RESPIRATION RATE: 13 BRPM | DIASTOLIC BLOOD PRESSURE: 70 MMHG | TEMPERATURE: 97.6 F

## 2023-05-10 DIAGNOSIS — E79.0 ELEVATED BLOOD URIC ACID LEVEL: ICD-10-CM

## 2023-05-10 DIAGNOSIS — E78.2 MIXED HYPERLIPIDEMIA: ICD-10-CM

## 2023-05-10 DIAGNOSIS — D64.9 ANEMIA, UNSPECIFIED TYPE: ICD-10-CM

## 2023-05-10 DIAGNOSIS — M10.00 IDIOPATHIC GOUT, UNSPECIFIED CHRONICITY, UNSPECIFIED SITE: ICD-10-CM

## 2023-05-10 DIAGNOSIS — Z23 NEED FOR PNEUMOCOCCAL VACCINATION: ICD-10-CM

## 2023-05-10 DIAGNOSIS — G43.009 MIGRAINE WITHOUT AURA AND WITHOUT STATUS MIGRAINOSUS, NOT INTRACTABLE: ICD-10-CM

## 2023-05-10 DIAGNOSIS — I10 ESSENTIAL HYPERTENSION: ICD-10-CM

## 2023-05-10 DIAGNOSIS — I10 ESSENTIAL HYPERTENSION: Primary | ICD-10-CM

## 2023-05-10 LAB
ALBUMIN SERPL-MCNC: 4.7 G/DL (ref 3.4–5)
ALBUMIN/GLOB SERPL: 2 {RATIO} (ref 1.1–2.2)
ALP SERPL-CCNC: 50 U/L (ref 40–129)
ALT SERPL-CCNC: 40 U/L (ref 10–40)
ANION GAP SERPL CALCULATED.3IONS-SCNC: 8 MMOL/L (ref 3–16)
AST SERPL-CCNC: 49 U/L (ref 15–37)
BILIRUB SERPL-MCNC: 0.4 MG/DL (ref 0–1)
BUN SERPL-MCNC: 24 MG/DL (ref 7–20)
CALCIUM SERPL-MCNC: 10.6 MG/DL (ref 8.3–10.6)
CHLORIDE SERPL-SCNC: 106 MMOL/L (ref 99–110)
CHOLEST SERPL-MCNC: 171 MG/DL (ref 0–199)
CO2 SERPL-SCNC: 30 MMOL/L (ref 21–32)
CREAT SERPL-MCNC: 1.1 MG/DL (ref 0.8–1.3)
DEPRECATED RDW RBC AUTO: 15 % (ref 12.4–15.4)
GFR SERPLBLD CREATININE-BSD FMLA CKD-EPI: >60 ML/MIN/{1.73_M2}
GLUCOSE SERPL-MCNC: 93 MG/DL (ref 70–99)
HCT VFR BLD AUTO: 41.6 % (ref 40.5–52.5)
HDLC SERPL-MCNC: 94 MG/DL (ref 40–60)
HGB BLD-MCNC: 13.6 G/DL (ref 13.5–17.5)
IRON SATN MFR SERPL: 28 % (ref 20–50)
IRON SERPL-MCNC: 83 UG/DL (ref 59–158)
LDLC SERPL CALC-MCNC: 66 MG/DL
MCH RBC QN AUTO: 30.9 PG (ref 26–34)
MCHC RBC AUTO-ENTMCNC: 32.8 G/DL (ref 31–36)
MCV RBC AUTO: 94.2 FL (ref 80–100)
PLATELET # BLD AUTO: 169 K/UL (ref 135–450)
PMV BLD AUTO: 8.4 FL (ref 5–10.5)
POTASSIUM SERPL-SCNC: 4.3 MMOL/L (ref 3.5–5.1)
PROT SERPL-MCNC: 7 G/DL (ref 6.4–8.2)
RBC # BLD AUTO: 4.41 M/UL (ref 4.2–5.9)
SODIUM SERPL-SCNC: 144 MMOL/L (ref 136–145)
TIBC SERPL-MCNC: 292 UG/DL (ref 260–445)
TRIGL SERPL-MCNC: 56 MG/DL (ref 0–150)
URATE SERPL-MCNC: 4.2 MG/DL (ref 3.5–7.2)
VLDLC SERPL CALC-MCNC: 11 MG/DL
WBC # BLD AUTO: 4 K/UL (ref 4–11)

## 2023-05-10 PROCEDURE — 3078F DIAST BP <80 MM HG: CPT | Performed by: INTERNAL MEDICINE

## 2023-05-10 PROCEDURE — 3074F SYST BP LT 130 MM HG: CPT | Performed by: INTERNAL MEDICINE

## 2023-05-10 PROCEDURE — 90677 PCV20 VACCINE IM: CPT | Performed by: INTERNAL MEDICINE

## 2023-05-10 PROCEDURE — 99214 OFFICE O/P EST MOD 30 MIN: CPT | Performed by: INTERNAL MEDICINE

## 2023-05-10 PROCEDURE — 1123F ACP DISCUSS/DSCN MKR DOCD: CPT | Performed by: INTERNAL MEDICINE

## 2023-05-10 PROCEDURE — 90471 IMMUNIZATION ADMIN: CPT | Performed by: INTERNAL MEDICINE

## 2023-05-10 RX ORDER — SIMVASTATIN 20 MG
TABLET ORAL
Qty: 90 TABLET | Refills: 1 | Status: SHIPPED | OUTPATIENT
Start: 2023-05-10

## 2023-05-10 RX ORDER — CLONIDINE HYDROCHLORIDE 0.1 MG/1
TABLET ORAL
Qty: 270 TABLET | Refills: 1 | Status: SHIPPED | OUTPATIENT
Start: 2023-05-10

## 2023-05-10 RX ORDER — ALLOPURINOL 300 MG/1
TABLET ORAL
Qty: 90 TABLET | Refills: 1 | Status: SHIPPED | OUTPATIENT
Start: 2023-05-10

## 2023-05-10 RX ORDER — OLMESARTAN MEDOXOMIL AND HYDROCHLOROTHIAZIDE 20/12.5 20; 12.5 MG/1; MG/1
TABLET ORAL
Qty: 90 TABLET | Refills: 1 | Status: SHIPPED | OUTPATIENT
Start: 2023-05-10

## 2023-05-10 RX ORDER — AMLODIPINE BESYLATE 10 MG/1
TABLET ORAL
Qty: 90 TABLET | Refills: 1 | Status: SHIPPED | OUTPATIENT
Start: 2023-05-10

## 2023-05-10 ASSESSMENT — ENCOUNTER SYMPTOMS
CHEST TIGHTNESS: 0
SHORTNESS OF BREATH: 0
WHEEZING: 0
RHINORRHEA: 0
VOMITING: 0
SINUS PAIN: 0
SORE THROAT: 0
CONSTIPATION: 0
DIARRHEA: 0
NAUSEA: 0
SINUS PRESSURE: 0
COUGH: 0
ABDOMINAL PAIN: 0

## 2023-05-10 ASSESSMENT — PATIENT HEALTH QUESTIONNAIRE - PHQ9
2. FEELING DOWN, DEPRESSED OR HOPELESS: 0
SUM OF ALL RESPONSES TO PHQ QUESTIONS 1-9: 0
1. LITTLE INTEREST OR PLEASURE IN DOING THINGS: 0
SUM OF ALL RESPONSES TO PHQ9 QUESTIONS 1 & 2: 0

## 2023-05-10 NOTE — PROGRESS NOTES
Date of Visit: 5/10/2023    Namita To (:  1960) is a 70 y.o. male,  Established patient here for evaluation of the following chief complaint(s):  Hypertension, Hyperlipidemia, Gout, and Migraine      ASSESSMENT/PLAN:    1. Essential hypertension  - stable  - Continue olmesartan-hydroCHLOROthiazide (BENICAR HCT) 20-12.5 MG per tablet; TAKE 1 TABLET EVERY DAY  Dispense: 90 tablet; Refill: 1  - Continue cloNIDine (CATAPRES) 0.1 MG tablet; TAKE 1 TABLET THREE TIMES DAILY  Dispense: 270 tablet; Refill: 1  - Continue amLODIPine (NORVASC) 10 MG tablet; TAKE 1 TABLET EVERY DAY  Dispense: 90 tablet; Refill: 1  -Low sodium diet  -Regular aerobic exercise    2. Mixed hyperlipidemia  -stable  -Continue simvastatin (ZOCOR) 20 MG tablet; TAKE 1 TABLET NIGHTLY  Dispense: 90 tablet; Refill: 1  -Low fat, low cholesterol diet  -Regular aerobic exercise    3. Migraine without aura and without status migrainosus, not intractable  -stable   -Continue Ibuprofen as needed  -Avoid NSAID's such as otc Ibuprofen, Advil, Motrin, Naprosyn, and Aleve as well as prescription NSAID's    4. Elevated blood uric acid level  -stable  -no gout attacks  -Continue allopurinol (ZYLOPRIM) 300 MG tablet; TAKE 1 TABLET EVERY DAY  Dispense: 90 tablet; Refill: 1    5. Idiopathic gout, unspecified chronicity, unspecified site  - stable  - no gout attacks  - Continue allopurinol (ZYLOPRIM) 300 MG tablet; TAKE 1 TABLET EVERY DAY  Dispense: 90 tablet; Refill: 1    6. Need for pneumococcal vaccination  - Pneumococcal, PCV20, PREVNAR 21, (age 25 yrs+), IM, PF given        Return in about 6 months (around 11/10/2023) for Welcome to Medicare initial preventative exam.    SUBJECTIVE:    Patient has Hypertension. Patient takes Olmesartan-HCTZ 20-12.5mg once daily, Clonidine 0.1mg 3 times daily, and Amlodipine 10mg once daily. Patient decreases salt. Patient does elliptical for 55 minutes 7 days per week and lifts weights 4 times per week.      Patient

## 2023-09-28 DIAGNOSIS — I10 ESSENTIAL HYPERTENSION: ICD-10-CM

## 2023-09-28 DIAGNOSIS — E79.0 ELEVATED BLOOD URIC ACID LEVEL: ICD-10-CM

## 2023-09-28 DIAGNOSIS — E78.2 MIXED HYPERLIPIDEMIA: ICD-10-CM

## 2023-09-28 DIAGNOSIS — M10.00 IDIOPATHIC GOUT, UNSPECIFIED CHRONICITY, UNSPECIFIED SITE: ICD-10-CM

## 2023-09-28 RX ORDER — ALLOPURINOL 300 MG/1
TABLET ORAL
Qty: 90 TABLET | Refills: 1 | Status: SHIPPED | OUTPATIENT
Start: 2023-09-28

## 2023-09-28 RX ORDER — AMLODIPINE BESYLATE 10 MG/1
TABLET ORAL
Qty: 90 TABLET | Refills: 1 | Status: SHIPPED | OUTPATIENT
Start: 2023-09-28

## 2023-09-28 RX ORDER — CLONIDINE HYDROCHLORIDE 0.1 MG/1
TABLET ORAL
Qty: 270 TABLET | Refills: 1 | Status: SHIPPED | OUTPATIENT
Start: 2023-09-28

## 2023-09-28 RX ORDER — OLMESARTAN MEDOXOMIL AND HYDROCHLOROTHIAZIDE 20/12.5 20; 12.5 MG/1; MG/1
TABLET ORAL
Qty: 90 TABLET | Refills: 1 | Status: SHIPPED | OUTPATIENT
Start: 2023-09-28

## 2023-09-28 RX ORDER — SIMVASTATIN 20 MG
TABLET ORAL
Qty: 90 TABLET | Refills: 1 | Status: SHIPPED | OUTPATIENT
Start: 2023-09-28

## 2023-09-28 NOTE — TELEPHONE ENCOUNTER
Medication:   Requested Prescriptions     Pending Prescriptions Disp Refills    olmesartan-hydroCHLOROthiazide (BENICAR HCT) 20-12.5 MG per tablet [Pharmacy Med Name: OLMESARTAN MEDOXOMIL/HYDROCHLOROTHIAZIDE 20-12.5 MG Tablet] 90 tablet 1     Sig: TAKE 1 TABLET EVERY DAY     Last Filled:  5.10.23    Last appt: 5/10/2023   Next appt: 11/14/2023    Last OARRS:        No data to display

## 2023-09-28 NOTE — TELEPHONE ENCOUNTER
Medication:   Requested Prescriptions     Pending Prescriptions Disp Refills    allopurinol (ZYLOPRIM) 300 MG tablet [Pharmacy Med Name: ALLOPURINOL 300 MG Tablet] 90 tablet 1     Sig: TAKE 1 TABLET EVERY DAY    cloNIDine (CATAPRES) 0.1 MG tablet [Pharmacy Med Name: CLONIDINE HYDROCHLORIDE 0.1 MG Tablet] 270 tablet 1     Sig: TAKE 1 TABLET THREE TIMES DAILY    simvastatin (ZOCOR) 20 MG tablet [Pharmacy Med Name: SIMVASTATIN 20 MG Tablet] 90 tablet 1     Sig: TAKE 1 TABLET EVERY NIGHT    amLODIPine (NORVASC) 10 MG tablet [Pharmacy Med Name: AMLODIPINE BESYLATE 10 MG Tablet] 90 tablet 1     Sig: TAKE 1 TABLET EVERY DAY     Last Filled:  05/10/2023    Last appt: 5/10/2023   Next appt: 11/14/2023    Last OARRS:        No data to display

## 2023-11-11 SDOH — HEALTH STABILITY: PHYSICAL HEALTH: ON AVERAGE, HOW MANY DAYS PER WEEK DO YOU ENGAGE IN MODERATE TO STRENUOUS EXERCISE (LIKE A BRISK WALK)?: 7 DAYS

## 2023-11-11 SDOH — HEALTH STABILITY: PHYSICAL HEALTH: ON AVERAGE, HOW MANY MINUTES DO YOU ENGAGE IN EXERCISE AT THIS LEVEL?: 150+ MIN

## 2023-11-11 ASSESSMENT — LIFESTYLE VARIABLES
HOW OFTEN DO YOU HAVE A DRINK CONTAINING ALCOHOL: 3
HOW OFTEN DO YOU HAVE SIX OR MORE DRINKS ON ONE OCCASION: 1
HOW MANY STANDARD DRINKS CONTAINING ALCOHOL DO YOU HAVE ON A TYPICAL DAY: PATIENT DOES NOT DRINK
HOW MANY STANDARD DRINKS CONTAINING ALCOHOL DO YOU HAVE ON A TYPICAL DAY: 0
HOW OFTEN DO YOU HAVE A DRINK CONTAINING ALCOHOL: 2-4 TIMES A MONTH

## 2023-11-11 ASSESSMENT — PATIENT HEALTH QUESTIONNAIRE - PHQ9
SUM OF ALL RESPONSES TO PHQ QUESTIONS 1-9: 0
SUM OF ALL RESPONSES TO PHQ9 QUESTIONS 1 & 2: 0
SUM OF ALL RESPONSES TO PHQ QUESTIONS 1-9: 0
2. FEELING DOWN, DEPRESSED OR HOPELESS: 0
SUM OF ALL RESPONSES TO PHQ QUESTIONS 1-9: 0
1. LITTLE INTEREST OR PLEASURE IN DOING THINGS: 0
SUM OF ALL RESPONSES TO PHQ QUESTIONS 1-9: 0

## 2023-11-14 ENCOUNTER — OFFICE VISIT (OUTPATIENT)
Dept: PRIMARY CARE CLINIC | Age: 63
End: 2023-11-14
Payer: COMMERCIAL

## 2023-11-14 VITALS
HEART RATE: 81 BPM | DIASTOLIC BLOOD PRESSURE: 74 MMHG | OXYGEN SATURATION: 97 % | SYSTOLIC BLOOD PRESSURE: 120 MMHG | TEMPERATURE: 98 F | HEIGHT: 67 IN | BODY MASS INDEX: 27.31 KG/M2 | WEIGHT: 174 LBS | RESPIRATION RATE: 16 BRPM

## 2023-11-14 DIAGNOSIS — E78.2 MIXED HYPERLIPIDEMIA: ICD-10-CM

## 2023-11-14 DIAGNOSIS — Z00.00 INITIAL MEDICARE ANNUAL WELLNESS VISIT: Primary | ICD-10-CM

## 2023-11-14 DIAGNOSIS — Z13.6 SCREENING FOR ISCHEMIC HEART DISEASE: ICD-10-CM

## 2023-11-14 DIAGNOSIS — Z12.5 SCREENING FOR PROSTATE CANCER: ICD-10-CM

## 2023-11-14 DIAGNOSIS — M10.00 IDIOPATHIC GOUT, UNSPECIFIED CHRONICITY, UNSPECIFIED SITE: ICD-10-CM

## 2023-11-14 DIAGNOSIS — G43.009 MIGRAINE WITHOUT AURA AND WITHOUT STATUS MIGRAINOSUS, NOT INTRACTABLE: ICD-10-CM

## 2023-11-14 DIAGNOSIS — R35.0 URINARY FREQUENCY: ICD-10-CM

## 2023-11-14 DIAGNOSIS — I10 ESSENTIAL HYPERTENSION: ICD-10-CM

## 2023-11-14 LAB
ALBUMIN SERPL-MCNC: 4.8 G/DL (ref 3.4–5)
ALBUMIN/GLOB SERPL: 1.9 {RATIO} (ref 1.1–2.2)
ALP SERPL-CCNC: 66 U/L (ref 40–129)
ALT SERPL-CCNC: 23 U/L (ref 10–40)
ANION GAP SERPL CALCULATED.3IONS-SCNC: 10 MMOL/L (ref 3–16)
AST SERPL-CCNC: 36 U/L (ref 15–37)
BILIRUB SERPL-MCNC: 0.4 MG/DL (ref 0–1)
BILIRUBIN, POC: NORMAL
BLOOD URINE, POC: NORMAL
BUN SERPL-MCNC: 18 MG/DL (ref 7–20)
CALCIUM SERPL-MCNC: 10.6 MG/DL (ref 8.3–10.6)
CHLORIDE SERPL-SCNC: 102 MMOL/L (ref 99–110)
CHOLEST SERPL-MCNC: 189 MG/DL (ref 0–199)
CLARITY, POC: CLEAR
CO2 SERPL-SCNC: 29 MMOL/L (ref 21–32)
COLOR, POC: YELLOW
CREAT SERPL-MCNC: 1.1 MG/DL (ref 0.8–1.3)
GFR SERPLBLD CREATININE-BSD FMLA CKD-EPI: >60 ML/MIN/{1.73_M2}
GLUCOSE SERPL-MCNC: 86 MG/DL (ref 70–99)
GLUCOSE URINE, POC: NORMAL
HDLC SERPL-MCNC: 85 MG/DL (ref 40–60)
KETONES, POC: NORMAL
LDLC SERPL CALC-MCNC: 90 MG/DL
LEUKOCYTE EST, POC: NORMAL
NITRITE, POC: NORMAL
PH, POC: 6.5
POTASSIUM SERPL-SCNC: 4.1 MMOL/L (ref 3.5–5.1)
PROT SERPL-MCNC: 7.3 G/DL (ref 6.4–8.2)
PROTEIN, POC: NORMAL
PSA SERPL DL<=0.01 NG/ML-MCNC: 3.82 NG/ML (ref 0–4)
SODIUM SERPL-SCNC: 141 MMOL/L (ref 136–145)
SPECIFIC GRAVITY, POC: 1.02
TRIGL SERPL-MCNC: 71 MG/DL (ref 0–150)
URATE SERPL-MCNC: 4.1 MG/DL (ref 3.5–7.2)
UROBILINOGEN, POC: 0.2
VLDLC SERPL CALC-MCNC: 14 MG/DL

## 2023-11-14 PROCEDURE — 3074F SYST BP LT 130 MM HG: CPT | Performed by: INTERNAL MEDICINE

## 2023-11-14 PROCEDURE — 1123F ACP DISCUSS/DSCN MKR DOCD: CPT | Performed by: INTERNAL MEDICINE

## 2023-11-14 PROCEDURE — 93000 ELECTROCARDIOGRAM COMPLETE: CPT | Performed by: INTERNAL MEDICINE

## 2023-11-14 PROCEDURE — G0438 PPPS, INITIAL VISIT: HCPCS | Performed by: INTERNAL MEDICINE

## 2023-11-14 PROCEDURE — 3078F DIAST BP <80 MM HG: CPT | Performed by: INTERNAL MEDICINE

## 2023-11-14 PROCEDURE — 81002 URINALYSIS NONAUTO W/O SCOPE: CPT | Performed by: INTERNAL MEDICINE

## 2023-11-14 NOTE — PROGRESS NOTES
Latest known visit with results is:   Orders Only on 05/10/2023   Component Date Value    Uric Acid, Serum 05/10/2023 4.2     Iron 05/10/2023 83     TIBC 05/10/2023 292     Iron Saturation 05/10/2023 28     WBC 05/10/2023 4.0     RBC 05/10/2023 4.41     Hemoglobin 05/10/2023 13.6     Hematocrit 05/10/2023 41.6     MCV 05/10/2023 94.2     MCH 05/10/2023 30.9     MCHC 05/10/2023 32.8     RDW 05/10/2023 15.0     Platelets 85/82/6846 169     MPV 05/10/2023 8.4     Cholesterol, Total 05/10/2023 171     Triglycerides 05/10/2023 56     HDL 05/10/2023 94 (H)     LDL Calculated 05/10/2023 66     VLDL Cholesterol Calcula* 05/10/2023 11     Sodium 05/10/2023 144     Potassium 05/10/2023 4.3     Chloride 05/10/2023 106     CO2 05/10/2023 30     Anion Gap 05/10/2023 8     Glucose 05/10/2023 93     BUN 05/10/2023 24 (H)     Creatinine 05/10/2023 1.1     Est, Glom Filt Rate 05/10/2023 >60     Calcium 05/10/2023 10.6     Total Protein 05/10/2023 7.0     Albumin 05/10/2023 4.7     Albumin/Globulin Ratio 05/10/2023 2.0     Total Bilirubin 05/10/2023 0.4     Alkaline Phosphatase 05/10/2023 50     ALT 05/10/2023 40     AST 05/10/2023 49 (H)        CareTeam (Including outside providers/suppliers regularly involved in providing care):   Patient Care Team:  Diego Bazan MD as PCP - General (Internal Medicine)  Diego Bazan MD as PCP - Empaneled Provider     Reviewed and updated this visit:  Tobacco  Allergies  Meds  Med Hx  Surg Hx  Soc Hx  Fam Hx

## 2023-11-27 PROBLEM — R35.0 URINARY FREQUENCY: Status: ACTIVE | Noted: 2023-11-27

## 2024-01-24 ENCOUNTER — OFFICE VISIT (OUTPATIENT)
Dept: PRIMARY CARE CLINIC | Age: 64
End: 2024-01-24
Payer: MEDICARE

## 2024-01-24 VITALS
SYSTOLIC BLOOD PRESSURE: 132 MMHG | HEART RATE: 49 BPM | RESPIRATION RATE: 18 BRPM | TEMPERATURE: 97.7 F | DIASTOLIC BLOOD PRESSURE: 82 MMHG | WEIGHT: 174.8 LBS | OXYGEN SATURATION: 99 % | BODY MASS INDEX: 27.44 KG/M2 | HEIGHT: 67 IN

## 2024-01-24 DIAGNOSIS — R97.20 ELEVATED PROSTATE SPECIFIC ANTIGEN (PSA): Primary | ICD-10-CM

## 2024-01-24 DIAGNOSIS — Z01.818 PRE-OP EXAM: ICD-10-CM

## 2024-01-24 DIAGNOSIS — I10 ESSENTIAL HYPERTENSION: ICD-10-CM

## 2024-01-24 DIAGNOSIS — G43.009 MIGRAINE WITHOUT AURA AND WITHOUT STATUS MIGRAINOSUS, NOT INTRACTABLE: ICD-10-CM

## 2024-01-24 DIAGNOSIS — E78.2 MIXED HYPERLIPIDEMIA: ICD-10-CM

## 2024-01-24 DIAGNOSIS — M10.00 IDIOPATHIC GOUT, UNSPECIFIED CHRONICITY, UNSPECIFIED SITE: ICD-10-CM

## 2024-01-24 PROCEDURE — 3079F DIAST BP 80-89 MM HG: CPT | Performed by: INTERNAL MEDICINE

## 2024-01-24 PROCEDURE — 1123F ACP DISCUSS/DSCN MKR DOCD: CPT | Performed by: INTERNAL MEDICINE

## 2024-01-24 PROCEDURE — 99214 OFFICE O/P EST MOD 30 MIN: CPT | Performed by: INTERNAL MEDICINE

## 2024-01-24 PROCEDURE — 93000 ELECTROCARDIOGRAM COMPLETE: CPT | Performed by: INTERNAL MEDICINE

## 2024-01-24 PROCEDURE — 3075F SYST BP GE 130 - 139MM HG: CPT | Performed by: INTERNAL MEDICINE

## 2024-01-24 RX ORDER — TAMSULOSIN HYDROCHLORIDE 0.4 MG/1
CAPSULE ORAL DAILY
COMMUNITY
Start: 2024-01-02

## 2024-01-24 ASSESSMENT — ENCOUNTER SYMPTOMS
BLOOD IN STOOL: 0
COUGH: 0
NAUSEA: 0
CHEST TIGHTNESS: 0
WHEEZING: 0
ABDOMINAL PAIN: 0
TROUBLE SWALLOWING: 0
DIARRHEA: 0
SHORTNESS OF BREATH: 0
SORE THROAT: 0
RHINORRHEA: 0
SINUS PRESSURE: 0
VOMITING: 0

## 2024-01-24 ASSESSMENT — PATIENT HEALTH QUESTIONNAIRE - PHQ9
1. LITTLE INTEREST OR PLEASURE IN DOING THINGS: 0
SUM OF ALL RESPONSES TO PHQ QUESTIONS 1-9: 0
SUM OF ALL RESPONSES TO PHQ9 QUESTIONS 1 & 2: 0
SUM OF ALL RESPONSES TO PHQ QUESTIONS 1-9: 0
2. FEELING DOWN, DEPRESSED OR HOPELESS: 0
SUM OF ALL RESPONSES TO PHQ QUESTIONS 1-9: 0
SUM OF ALL RESPONSES TO PHQ QUESTIONS 1-9: 0

## 2024-01-24 NOTE — PATIENT INSTRUCTIONS
-Continue same medications  -Avoid anti-inflammatories, aspirin, and fish oil for 7 days prior to surgery  -Tylenol 650mg 3 times daily if needed for pain  -Nothing to eat or drink after midnight prior to surgery

## 2024-01-24 NOTE — PROGRESS NOTES
Date of Visit: 2024    Reyes Nguyen (:  1960) is a 64 y.o. male,  Established patient here for evaluation of the following chief complaint(s):  Pre-op Exam (2024, Dx: R97.20, [facility] Prostate Biopsy,Dr. Moreno Cedeño, [phone])      ASSESSMENT/PLAN:    1. Elevated prostate specific antigen (PSA)  -Preoperative history and physical done   -Patient is in satisfactory condition for a Tr[facility] Prostate biopsy to be done on 24 by Dr. Moreno Cedeño    2. Pre-op exam  -Preoperative history and physical done   -Patient is in satisfactory condition for a Trus Prostate biopsy to be done on 24 by Dr. Moreno Cedeño  -Continue same medications  -Avoid anti-inflammatories, aspirin, and fish oil for 7 days prior to surgery  -Tylenol 650mg 3 times daily if needed for pain  -Nothing to eat or drink after midnight prior to surgery  -EKG 12 Lead - Clinic Performed done and shows marked sinus bradycardia and is similar to prior EKG's and stable. EKG is ok for surgery    3. Essential hypertension  - stable  - Continue olmesartan-hydroCHLOROthiazide (BENICAR HCT) 20-12.5 MG per tablet; TAKE 1 TABLET EVERY DAY    - Continue cloNIDine (CATAPRES) 0.1 MG tablet; TAKE 1 TABLET THREE TIMES DAILY   - Continue amLODIPine (NORVASC) 10 MG tablet; TAKE 1 TABLET EVERY DAY    -Low sodium diet  -Regular aerobic exercis    4. Mixed hyperlipidemia  -stable  -Continue simvastatin (ZOCOR) 20 MG tablet; TAKE 1 TABLET NIGHTLY    -Low fat, low cholesterol diet  -Regular aerobic exercise    5. Idiopathic gout, unspecified chronicity, unspecified site  - stable  - no gout attacks  - Continue allopurinol (ZYLOPRIM) 300 MG tablet; TAKE 1 TABLET EVERY DAY    6. Migraine without aura and without status migrainosus, not intractable  -stable  -avoid migraine triggers  -Continue Ibuprofen as needed except avoid Ibuprofen for 7 days prior to surgery        Return if symptoms worsen or fail to improve.    SUBJECTIVE:    Patient presents for a

## 2024-01-25 ASSESSMENT — ENCOUNTER SYMPTOMS: CONSTIPATION: 0

## 2024-04-28 NOTE — TELEPHONE ENCOUNTER
Medication:   Requested Prescriptions     Pending Prescriptions Disp Refills    propranolol (INDERAL) 10 MG tablet [Pharmacy Med Name: PROPRANOLOL 10MG TABLETS] 180 tablet 0     Sig: TAKE 1 TABLET BY MOUTH TWICE DAILY       Last Filled:  9/14/2018    Patient Phone Number: [phone] (home) [phone] (work)    Last appt: 10/8/2018  Next appt: None    Last BMP:   Lab Results   Component Value Date     10/08/2018    K 4.6 10/08/2018    CL 96 10/08/2018    CO2 28 10/08/2018    ANIONGAP 14 10/08/2018    GLUCOSE 96 10/08/2018    BUN 14 10/08/2018    CREATININE 1.2 10/08/2018    LABGLOM >60 10/08/2018    GFRAA >60 10/08/2018    GFRAA >60 02/17/2012    CALCIUM 10.5 10/08/2018      Last CMP:   Lab Results   Component Value Date     10/08/2018    K 4.6 10/08/2018    CL 96 10/08/2018    CO2 28 10/08/2018    ANIONGAP 14 10/08/2018    GLUCOSE 96 10/08/2018    BUN 14 10/08/2018    CREATININE 1.2 10/08/2018    LABGLOM >60 10/08/2018    GFRAA >60 10/08/2018    GFRAA >60 02/17/2012    PROT 7.4 10/08/2018    PROT 7.8 02/17/2012    LABALBU 5.0 10/08/2018    AGRATIO 2.1 10/08/2018    BILITOT 0.7 10/08/2018    ALKPHOS 47 10/08/2018    ALT 16 10/08/2018    AST 26 10/08/2018    GLOB 2.4 10/08/2018     Last Renal Function:   Lab Results   Component Value Date     10/08/2018    K 4.6 10/08/2018    CL 96 10/08/2018    CO2 28 10/08/2018    GLUCOSE 96 10/08/2018    BUN 14 10/08/2018    CREATININE 1.2 10/08/2018    LABALBU 5.0 10/08/2018    CALCIUM 10.5 10/08/2018    GFR 52 02/17/2012    GFRAA >60 10/08/2018    GFRAA >60 02/17/2012       Last OARRS: No flowsheet data found.     Preferred Pharmacy:   [address], [address] [facility] [phone] Jose Alejandro Wolf [phone]  [facility] [place]  [place] [address]  Phone: [phone] Fax: [phone]    [facility] [address] [place] [address] - F [phone]  [address] [place] [address] [FreeTextEntry1] : Holds his urine for long periods of time [Dysuria] : no dysuria [Hematuria - Gross] : no gross hematuria [Fever] : no fever [None] : None

## 2024-05-11 SDOH — ECONOMIC STABILITY: FOOD INSECURITY: WITHIN THE PAST 12 MONTHS, YOU WORRIED THAT YOUR FOOD WOULD RUN OUT BEFORE YOU GOT MONEY TO BUY MORE.: NEVER TRUE

## 2024-05-11 SDOH — ECONOMIC STABILITY: FOOD INSECURITY: WITHIN THE PAST 12 MONTHS, THE FOOD YOU BOUGHT JUST DIDN'T LAST AND YOU DIDN'T HAVE MONEY TO GET MORE.: NEVER TRUE

## 2024-05-11 SDOH — ECONOMIC STABILITY: INCOME INSECURITY: HOW HARD IS IT FOR YOU TO PAY FOR THE VERY BASICS LIKE FOOD, HOUSING, MEDICAL CARE, AND HEATING?: NOT HARD AT ALL

## 2024-05-14 ENCOUNTER — OFFICE VISIT (OUTPATIENT)
Dept: PRIMARY CARE CLINIC | Age: 64
End: 2024-05-14
Payer: MEDICARE

## 2024-05-14 VITALS
BODY MASS INDEX: 28.36 KG/M2 | WEIGHT: 178.4 LBS | SYSTOLIC BLOOD PRESSURE: 134 MMHG | HEART RATE: 58 BPM | OXYGEN SATURATION: 98 % | DIASTOLIC BLOOD PRESSURE: 78 MMHG

## 2024-05-14 DIAGNOSIS — M10.00 IDIOPATHIC GOUT, UNSPECIFIED CHRONICITY, UNSPECIFIED SITE: ICD-10-CM

## 2024-05-14 DIAGNOSIS — G43.009 MIGRAINE WITHOUT AURA AND WITHOUT STATUS MIGRAINOSUS, NOT INTRACTABLE: ICD-10-CM

## 2024-05-14 DIAGNOSIS — E78.2 MIXED HYPERLIPIDEMIA: ICD-10-CM

## 2024-05-14 DIAGNOSIS — E79.0 ELEVATED BLOOD URIC ACID LEVEL: ICD-10-CM

## 2024-05-14 DIAGNOSIS — I10 ESSENTIAL HYPERTENSION: Primary | ICD-10-CM

## 2024-05-14 DIAGNOSIS — I10 ESSENTIAL HYPERTENSION: ICD-10-CM

## 2024-05-14 LAB
ALBUMIN SERPL-MCNC: 4.7 G/DL (ref 3.4–5)
ALBUMIN/GLOB SERPL: 2 {RATIO} (ref 1.1–2.2)
ALP SERPL-CCNC: 57 U/L (ref 40–129)
ALT SERPL-CCNC: 22 U/L (ref 10–40)
ANION GAP SERPL CALCULATED.3IONS-SCNC: 12 MMOL/L (ref 3–16)
AST SERPL-CCNC: 32 U/L (ref 15–37)
BILIRUB SERPL-MCNC: 0.4 MG/DL (ref 0–1)
BUN SERPL-MCNC: 21 MG/DL (ref 7–20)
CALCIUM SERPL-MCNC: 10.7 MG/DL (ref 8.3–10.6)
CHLORIDE SERPL-SCNC: 105 MMOL/L (ref 99–110)
CHOLEST SERPL-MCNC: 176 MG/DL (ref 0–199)
CO2 SERPL-SCNC: 27 MMOL/L (ref 21–32)
CREAT SERPL-MCNC: 1.3 MG/DL (ref 0.8–1.3)
GFR SERPLBLD CREATININE-BSD FMLA CKD-EPI: 60 ML/MIN/{1.73_M2}
GLUCOSE SERPL-MCNC: 95 MG/DL (ref 70–99)
HDLC SERPL-MCNC: 85 MG/DL (ref 40–60)
LDLC SERPL CALC-MCNC: 76 MG/DL
POTASSIUM SERPL-SCNC: 4.4 MMOL/L (ref 3.5–5.1)
PROT SERPL-MCNC: 7.1 G/DL (ref 6.4–8.2)
SODIUM SERPL-SCNC: 144 MMOL/L (ref 136–145)
TRIGL SERPL-MCNC: 77 MG/DL (ref 0–150)
URATE SERPL-MCNC: 5.4 MG/DL (ref 3.5–7.2)
VLDLC SERPL CALC-MCNC: 15 MG/DL

## 2024-05-14 PROCEDURE — 99214 OFFICE O/P EST MOD 30 MIN: CPT | Performed by: INTERNAL MEDICINE

## 2024-05-14 PROCEDURE — 3078F DIAST BP <80 MM HG: CPT | Performed by: INTERNAL MEDICINE

## 2024-05-14 PROCEDURE — 1123F ACP DISCUSS/DSCN MKR DOCD: CPT | Performed by: INTERNAL MEDICINE

## 2024-05-14 PROCEDURE — 3075F SYST BP GE 130 - 139MM HG: CPT | Performed by: INTERNAL MEDICINE

## 2024-05-14 RX ORDER — ALLOPURINOL 300 MG/1
300 TABLET ORAL DAILY
Qty: 90 TABLET | Refills: 1 | Status: SHIPPED | OUTPATIENT
Start: 2024-05-14

## 2024-05-14 RX ORDER — AMLODIPINE BESYLATE 10 MG/1
10 TABLET ORAL DAILY
Qty: 90 TABLET | Refills: 1 | Status: SHIPPED | OUTPATIENT
Start: 2024-05-14

## 2024-05-14 RX ORDER — OLMESARTAN MEDOXOMIL AND HYDROCHLOROTHIAZIDE 20/12.5 20; 12.5 MG/1; MG/1
1 TABLET ORAL DAILY
Qty: 90 TABLET | Refills: 1 | Status: SHIPPED | OUTPATIENT
Start: 2024-05-14

## 2024-05-14 RX ORDER — CLONIDINE HYDROCHLORIDE 0.1 MG/1
0.1 TABLET ORAL 3 TIMES DAILY
Qty: 270 TABLET | Refills: 1 | Status: SHIPPED | OUTPATIENT
Start: 2024-05-14

## 2024-05-14 RX ORDER — SIMVASTATIN 20 MG
TABLET ORAL
Qty: 90 TABLET | Refills: 1 | Status: SHIPPED | OUTPATIENT
Start: 2024-05-14

## 2024-05-14 NOTE — PROGRESS NOTES
Date of Visit: 2024    Reyes Nguyen (:  1960) is a 64 y.o. male,  Established patient here for evaluation of the following chief complaint(s):  Hypertension, Cholesterol Problem, Gout, and Migraine      ASSESSMENT/PLAN:    1. Essential hypertension  -stable  -Continue amLODIPine (NORVASC) 10 MG tablet; Take 1 tablet by mouth daily  Dispense: 90 tablet; Refill: 1  -Continue cloNIDine (CATAPRES) 0.1 MG tablet; Take 1 tablet by mouth 3 times daily  Dispense: 270 tablet; Refill: 1  -Continue olmesartan-hydroCHLOROthiazide (BENICAR HCT) 20-12.5 MG per tablet; Take 1 tablet by mouth daily  Dispense: 90 tablet; Refill: 1  -Low sodium diet  -Regular aerobic exercise  -Comprehensive Metabolic Panel; Future    2. Mixed hyperlipidemia  -stable  -Continue simvastatin (ZOCOR) 20 MG tablet; TAKE 1 TABLET EVERY NIGHT  Dispense: 90 tablet; Refill: 1  -Low fat, low cholesterol diet  -Regular aerobic exercise  -Comprehensive Metabolic Panel; Future  -Lipid Panel; Future    3. Migraine without aura and without status migrainosus, not intractable  -stable   -Continue Ibuprofen as needed  -Avoid NSAID's such as otc Ibuprofen, Advil, Motrin, Naprosyn, and Aleve as well as prescription NSAID's    4. Idiopathic gout, unspecified chronicity, unspecified site  -Stable  -no gout attack  -Continue allopurinol (ZYLOPRIM) 300 MG tablet; Take 1 tablet by mouth daily  Dispense: 90 tablet; Refill: 1  -Uric Acid; Future    5. Elevated blood uric acid level  -Stable  -Continue allopurinol (ZYLOPRIM) 300 MG tablet; Take 1 tablet by mouth daily  Dispense: 90 tablet; Refill: 1  -Uric Acid; Future      Return in about 6 months (around 11/15/2024) for Medicare AW.    SUBJECTIVE:    Patient has Hypertension. Patient takes Olmesartan-HCTZ 20-12.5mg once daily, Clonidine 0.1mg 3 times daily, and Amlodipine 10mg once daily. Patient decreases salt. Patient does elliptical for 55 minutes 7 days per week, lifts weights 4 times per week, and

## 2024-05-21 ASSESSMENT — ENCOUNTER SYMPTOMS
WHEEZING: 0
COUGH: 0
SORE THROAT: 0
SINUS PAIN: 0
CHEST TIGHTNESS: 0
VOMITING: 0
RHINORRHEA: 0
NAUSEA: 0
SINUS PRESSURE: 0
CONSTIPATION: 0
ABDOMINAL PAIN: 0
SHORTNESS OF BREATH: 0
DIARRHEA: 0

## 2024-11-08 DIAGNOSIS — M10.00 IDIOPATHIC GOUT, UNSPECIFIED CHRONICITY, UNSPECIFIED SITE: ICD-10-CM

## 2024-11-08 DIAGNOSIS — I10 ESSENTIAL HYPERTENSION: ICD-10-CM

## 2024-11-08 DIAGNOSIS — E79.0 ELEVATED BLOOD URIC ACID LEVEL: ICD-10-CM

## 2024-11-08 DIAGNOSIS — E78.2 MIXED HYPERLIPIDEMIA: ICD-10-CM

## 2024-11-08 NOTE — TELEPHONE ENCOUNTER
Medication:   Requested Prescriptions     Pending Prescriptions Disp Refills    olmesartan-hydroCHLOROthiazide (BENICAR HCT) 20-12.5 MG per tablet [Pharmacy Med Name: OLMESARTAN MEDOX/HCTZ 20-12.5MG TAB] 90 tablet 1     Sig: TAKE 1 TABLET BY MOUTH DAILY    cloNIDine (CATAPRES) 0.1 MG tablet [Pharmacy Med Name: CLONIDINE 0.1MG TABLETS] 270 tablet 1     Sig: TAKE 1 TABLET BY MOUTH THREE TIMES DAILY    simvastatin (ZOCOR) 20 MG tablet [Pharmacy Med Name: SIMVASTATIN 20MG TABLETS] 90 tablet 1     Sig: TAKE 1 TABLET BY MOUTH EVERY NIGHT    amLODIPine (NORVASC) 10 MG tablet [Pharmacy Med Name: AMLODIPINE BESYLATE 10MG TABLETS] 90 tablet 1     Sig: TAKE 1 TABLET BY MOUTH DAILY    allopurinol (ZYLOPRIM) 300 MG tablet [Pharmacy Med Name: ALLOPURINOL 300MG TABLETS] 90 tablet 1     Sig: TAKE 1 TABLET BY MOUTH DAILY     Last Filled:  5/14/2024    Last appt: 5/14/2024   Next appt: 11/15/2024    Last Lipid:   Lab Results   Component Value Date/Time    CHOL 176 05/14/2024 09:09 AM    TRIG 77 05/14/2024 09:09 AM    HDL 85 05/14/2024 09:09 AM

## 2024-11-09 DIAGNOSIS — I10 ESSENTIAL HYPERTENSION: ICD-10-CM

## 2024-11-09 RX ORDER — ALLOPURINOL 300 MG/1
300 TABLET ORAL DAILY
Qty: 90 TABLET | Refills: 1 | Status: SHIPPED | OUTPATIENT
Start: 2024-11-09

## 2024-11-09 RX ORDER — OLMESARTAN MEDOXOMIL AND HYDROCHLOROTHIAZIDE 20/12.5 20; 12.5 MG/1; MG/1
1 TABLET ORAL DAILY
Qty: 90 TABLET | Refills: 1 | Status: SHIPPED | OUTPATIENT
Start: 2024-11-09

## 2024-11-09 RX ORDER — SIMVASTATIN 20 MG
TABLET ORAL
Qty: 90 TABLET | Refills: 1 | Status: SHIPPED | OUTPATIENT
Start: 2024-11-09

## 2024-11-09 RX ORDER — CLONIDINE HYDROCHLORIDE 0.1 MG/1
0.1 TABLET ORAL 3 TIMES DAILY
Qty: 270 TABLET | Refills: 1 | Status: SHIPPED | OUTPATIENT
Start: 2024-11-09

## 2024-11-09 RX ORDER — AMLODIPINE BESYLATE 10 MG/1
10 TABLET ORAL DAILY
Qty: 90 TABLET | Refills: 1 | Status: SHIPPED | OUTPATIENT
Start: 2024-11-09

## 2024-11-11 RX ORDER — AMLODIPINE BESYLATE 10 MG/1
10 TABLET ORAL DAILY
Qty: 90 TABLET | Refills: 1 | OUTPATIENT
Start: 2024-11-11

## 2024-11-12 SDOH — HEALTH STABILITY: PHYSICAL HEALTH: ON AVERAGE, HOW MANY DAYS PER WEEK DO YOU ENGAGE IN MODERATE TO STRENUOUS EXERCISE (LIKE A BRISK WALK)?: 6 DAYS

## 2024-11-12 SDOH — HEALTH STABILITY: PHYSICAL HEALTH: ON AVERAGE, HOW MANY MINUTES DO YOU ENGAGE IN EXERCISE AT THIS LEVEL?: 60 MIN

## 2024-11-12 ASSESSMENT — LIFESTYLE VARIABLES
HOW MANY STANDARD DRINKS CONTAINING ALCOHOL DO YOU HAVE ON A TYPICAL DAY: PATIENT DOES NOT DRINK
HOW OFTEN DO YOU HAVE A DRINK CONTAINING ALCOHOL: NEVER
HOW MANY STANDARD DRINKS CONTAINING ALCOHOL DO YOU HAVE ON A TYPICAL DAY: 0
HOW OFTEN DO YOU HAVE SIX OR MORE DRINKS ON ONE OCCASION: 1
HOW OFTEN DO YOU HAVE A DRINK CONTAINING ALCOHOL: 1

## 2024-11-12 ASSESSMENT — PATIENT HEALTH QUESTIONNAIRE - PHQ9
SUM OF ALL RESPONSES TO PHQ QUESTIONS 1-9: 0
SUM OF ALL RESPONSES TO PHQ9 QUESTIONS 1 & 2: 0
SUM OF ALL RESPONSES TO PHQ QUESTIONS 1-9: 0
2. FEELING DOWN, DEPRESSED OR HOPELESS: NOT AT ALL
1. LITTLE INTEREST OR PLEASURE IN DOING THINGS: NOT AT ALL

## 2024-11-15 ENCOUNTER — OFFICE VISIT (OUTPATIENT)
Dept: PRIMARY CARE CLINIC | Age: 64
End: 2024-11-15

## 2024-11-15 VITALS
BODY MASS INDEX: 30.1 KG/M2 | HEART RATE: 52 BPM | HEIGHT: 67 IN | WEIGHT: 191.8 LBS | OXYGEN SATURATION: 100 % | SYSTOLIC BLOOD PRESSURE: 130 MMHG | RESPIRATION RATE: 18 BRPM | TEMPERATURE: 98.1 F | DIASTOLIC BLOOD PRESSURE: 84 MMHG

## 2024-11-15 DIAGNOSIS — M10.00 IDIOPATHIC GOUT, UNSPECIFIED CHRONICITY, UNSPECIFIED SITE: ICD-10-CM

## 2024-11-15 DIAGNOSIS — I10 ESSENTIAL HYPERTENSION: ICD-10-CM

## 2024-11-15 DIAGNOSIS — E78.2 MIXED HYPERLIPIDEMIA: ICD-10-CM

## 2024-11-15 DIAGNOSIS — E79.0 ELEVATED BLOOD URIC ACID LEVEL: ICD-10-CM

## 2024-11-15 DIAGNOSIS — Z00.00 MEDICARE ANNUAL WELLNESS VISIT, SUBSEQUENT: Primary | ICD-10-CM

## 2024-11-15 DIAGNOSIS — G43.009 MIGRAINE WITHOUT AURA AND WITHOUT STATUS MIGRAINOSUS, NOT INTRACTABLE: ICD-10-CM

## 2024-11-15 DIAGNOSIS — Z23 NEED FOR TDAP VACCINATION: ICD-10-CM

## 2024-11-15 LAB
ALBUMIN SERPL-MCNC: 4.6 G/DL (ref 3.4–5)
ALBUMIN/GLOB SERPL: 2 {RATIO} (ref 1.1–2.2)
ALP SERPL-CCNC: 55 U/L (ref 40–129)
ALT SERPL-CCNC: 50 U/L (ref 10–40)
ANION GAP SERPL CALCULATED.3IONS-SCNC: 9 MMOL/L (ref 3–16)
AST SERPL-CCNC: 51 U/L (ref 15–37)
BILIRUB SERPL-MCNC: 0.3 MG/DL (ref 0–1)
BUN SERPL-MCNC: 18 MG/DL (ref 7–20)
CALCIUM SERPL-MCNC: 10.4 MG/DL (ref 8.3–10.6)
CHLORIDE SERPL-SCNC: 104 MMOL/L (ref 99–110)
CHOLEST SERPL-MCNC: 182 MG/DL (ref 0–199)
CO2 SERPL-SCNC: 29 MMOL/L (ref 21–32)
CREAT SERPL-MCNC: 1.3 MG/DL (ref 0.8–1.3)
GFR SERPLBLD CREATININE-BSD FMLA CKD-EPI: 60 ML/MIN/{1.73_M2}
GLUCOSE SERPL-MCNC: 94 MG/DL (ref 70–99)
HDLC SERPL-MCNC: 69 MG/DL (ref 40–60)
LDLC SERPL CALC-MCNC: 99 MG/DL
POTASSIUM SERPL-SCNC: 4.2 MMOL/L (ref 3.5–5.1)
PROT SERPL-MCNC: 6.9 G/DL (ref 6.4–8.2)
SODIUM SERPL-SCNC: 142 MMOL/L (ref 136–145)
TRIGL SERPL-MCNC: 70 MG/DL (ref 0–150)
URATE SERPL-MCNC: 4.8 MG/DL (ref 3.5–7.2)
VLDLC SERPL CALC-MCNC: 14 MG/DL

## 2024-11-15 NOTE — PROGRESS NOTES
Medicare Annual Wellness Visit    Reyes Nguyen is here for Medicare AWV    Assessment & Plan   1. Medicare annual wellness visit, subsequent  Assessment & Plan:  -Medicare AWV done   2. Essential hypertension  Assessment & Plan:  -stable  -Continue Olmesartan-HCTZ 20-12.5mg once daily  -Continue Clonidine 0.1mg 3 times daily  -Continue Amlodipine 10mg once daily  -Low sodium diet  -Regular aerobic exercise   Orders:  -     Comprehensive Metabolic Panel; Future  3. Mixed hyperlipidemia  Assessment & Plan:  -stable  -Continue simvastatin 20mg nightly  -Low fat, low cholesterol diet  -Regular aerobic exercise  Orders:  -     Comprehensive Metabolic Panel; Future  -     Lipid Panel; Future  4. Idiopathic gout, unspecified chronicity, unspecified site  Assessment & Plan:  -stable  -no recent gout attacks  -continue allopurinol 300mg once daily   Orders:  -     Uric Acid; Future  5. Elevated blood uric acid level  Assessment & Plan:  -stable  -no recent gout attacks  -Continue allopurinol 300mg once daily  Orders:  -     Uric Acid; Future  6. Migraine without aura and without status migrainosus, not intractable  Assessment & Plan:  -stable  -no recent migraines  -avoid migraine triggers  7. Need for Tdap vaccination  Assessment & Plan:  -Patient given prescription for Tdap vaccine to have done at their pharmacy   Orders:  -     Tetanus-Diphth-Acell Pertussis (BOOSTRIX) 5-2.5-18.5 LF-MCG/0.5 injection; Inject 0.5 mLs into the muscle once for 1 dose, Disp-0.5 mL, R-0Print          Recommendations for Preventive Services Due: see orders and patient instructions/AVS.  Recommended screening schedule for the next 5-10 years is provided to the patient in written form: see Patient Instructions/AVS.     Return in about 6 months (around 5/15/2025) for hypertension, hyperlipidemia, gout, and migraines.     Subjective   The following acute and/or chronic problems were also addressed today:    Patient has Hypertension. Patient takes

## 2024-11-15 NOTE — PATIENT INSTRUCTIONS
Sweating.     Shortness of breath.     Pain, pressure, or a strange feeling in the back, neck, jaw, or upper belly or in one or both shoulders or arms.     Lightheadedness or sudden weakness.     A fast or irregular heartbeat.   After you call 911, the  may tell you to chew 1 adult-strength or 2 to 4 low-dose aspirin. Wait for an ambulance. Do not try to drive yourself.  Watch closely for changes in your health, and be sure to contact your doctor if you have any problems.  Where can you learn more?  Go to https://www.[employer].net/patientEd and enter F075 to learn more about \"A Healthy Heart: Care Instructions.\"  Current as of: June 24, 2023  Content Version: 14.2  © 2024 [employer].   Care instructions adapted under license by [employer]. If you have questions about a medical condition or this instruction, always ask your healthcare professional. Health[employer], [employer] disclaims any warranty or liability for your use of this information.      Personalized Preventive Plan for Reyes Nguyen - 11/15/2024  Medicare offers a range of preventive health benefits. Some of the tests and screenings are paid in full while other may be subject to a deductible, co-insurance, and/or copay.    Some of these benefits include a comprehensive review of your medical history including lifestyle, illnesses that may run in your family, and various assessments and screenings as appropriate.    After reviewing your medical record and screening and assessments performed today your provider may have ordered immunizations, labs, imaging, and/or referrals for you.  A list of these orders (if applicable) as well as your Preventive Care list are included within your After Visit Summary for your review.    Other Preventive Recommendations:    A preventive eye exam performed by an eye specialist is recommended every 1-2 years to screen for glaucoma; cataracts, macular degeneration, and other eye disorders.  A preventive

## 2024-11-28 PROBLEM — Z23 NEED FOR TDAP VACCINATION: Status: ACTIVE | Noted: 2024-11-28

## 2024-11-28 PROBLEM — Z00.00 MEDICARE ANNUAL WELLNESS VISIT, SUBSEQUENT: Status: ACTIVE | Noted: 2024-11-28

## 2024-11-28 NOTE — ASSESSMENT & PLAN NOTE
-stable  -Continue Olmesartan-HCTZ 20-12.5mg once daily  -Continue Clonidine 0.1mg 3 times daily  -Continue Amlodipine 10mg once daily  -Low sodium diet  -Regular aerobic exercise

## 2024-11-28 NOTE — ASSESSMENT & PLAN NOTE
-stable  -Continue simvastatin 20mg nightly  -Low fat, low cholesterol diet  -Regular aerobic exercise

## 2024-12-28 PROBLEM — Z00.00 MEDICARE ANNUAL WELLNESS VISIT, SUBSEQUENT: Status: RESOLVED | Noted: 2024-11-28 | Resolved: 2024-12-28

## 2025-02-23 NOTE — PROGRESS NOTES
Preoperative Consultation    Name: Reyes Nguyen  YOB: 1960    This patient presents to the office today for a preoperative consultation at the request of surgeon, , who plans on performing TRUS PROSTATE BIOPSY on March 20, 2025 at [facility].      Planned anesthesia: General   Known anesthesia problems: None   Bleeding risk: No recent or remote history of abnormal bleeding  Personal or FH ofDVT/PE: No      UROLOGY  H/o elevated PSA  S/p Trus Prostate biopsy 2/2024. Negative.   PSA has gone down, but going to do another biopsy.   Will have PSA checked 3/13    HYPERTENSION  On omesartan/hctz 20/12.5, clonidine 0.1mg TID, amlodipine 10mg daily  BP elevated today. States he is in some pain today  BP usually well controlled.     LIPIDS  On simvastatin 20mg daily    GOUT  On allopurinol 300mg daily    THUMB  Complains of right thumb trigger finger  Pain.   Would like referral.   Feels a lump at the base of his thumb.   No nsaids.       Patient Active Problem List   Diagnosis    Hypertension    Hyperlipidemia    Chronic kidney disease    Hyperglycemia    Migraine without aura and without status migrainosus, not intractable    Elevated blood uric acid level    Anemia    Bradycardia    Idiopathic gout    Essential hypertension    Urinary frequency    Need for Tdap vaccination     Past Surgical History:   Procedure Laterality Date    COLONOSCOPY  1/23/2009    [facility], repeat in 10 years    UPPER GASTROINTESTINAL ENDOSCOPY  1/23/2009    [facility], antral gastritis    WISDOM TOOTH EXTRACTION  as a teen    x4     Allergies   Allergen Reactions    Lisinopril Other (See Comments)     Feeling of throat swelling     Outpatient Medications Marked as Taking for the 2/25/25 encounter (Office Visit) with Faith Finnegan APRN - CNP   Medication Sig Dispense Refill    olmesartan-hydroCHLOROthiazide (BENICAR HCT) 20-12.5 MG per tablet TAKE 1 TABLET BY MOUTH DAILY 90 tablet 1    cloNIDine (CATAPRES) 0.1 MG

## 2025-02-25 ENCOUNTER — HOSPITAL ENCOUNTER (OUTPATIENT)
Dept: GENERAL RADIOLOGY | Age: 65
Discharge: HOME | End: 2025-02-25
Payer: MEDICARE

## 2025-02-25 ENCOUNTER — OFFICE VISIT (OUTPATIENT)
Dept: PRIMARY CARE CLINIC | Age: 65
End: 2025-02-25

## 2025-02-25 VITALS
SYSTOLIC BLOOD PRESSURE: 150 MMHG | WEIGHT: 192 LBS | OXYGEN SATURATION: 98 % | HEART RATE: 66 BPM | BODY MASS INDEX: 30.53 KG/M2 | RESPIRATION RATE: 16 BRPM | TEMPERATURE: 97.8 F | DIASTOLIC BLOOD PRESSURE: 86 MMHG

## 2025-02-25 DIAGNOSIS — Z01.818 PRE-OP EXAMINATION: ICD-10-CM

## 2025-02-25 DIAGNOSIS — I10 ESSENTIAL HYPERTENSION: ICD-10-CM

## 2025-02-25 DIAGNOSIS — R97.20 ELEVATED PROSTATE SPECIFIC ANTIGEN (PSA): Primary | ICD-10-CM

## 2025-02-25 DIAGNOSIS — E78.2 MIXED HYPERLIPIDEMIA: ICD-10-CM

## 2025-02-25 DIAGNOSIS — M79.644 PAIN OF RIGHT THUMB: ICD-10-CM

## 2025-02-25 DIAGNOSIS — M10.00 IDIOPATHIC GOUT, UNSPECIFIED CHRONICITY, UNSPECIFIED SITE: ICD-10-CM

## 2025-02-25 DIAGNOSIS — M65.311 TRIGGER FINGER OF RIGHT THUMB: ICD-10-CM

## 2025-02-25 PROCEDURE — 73140 X-RAY EXAM OF FINGER(S): CPT

## 2025-02-25 SDOH — ECONOMIC STABILITY: FOOD INSECURITY: WITHIN THE PAST 12 MONTHS, YOU WORRIED THAT YOUR FOOD WOULD RUN OUT BEFORE YOU GOT MONEY TO BUY MORE.: NEVER TRUE

## 2025-02-25 SDOH — ECONOMIC STABILITY: FOOD INSECURITY: WITHIN THE PAST 12 MONTHS, THE FOOD YOU BOUGHT JUST DIDN'T LAST AND YOU DIDN'T HAVE MONEY TO GET MORE.: NEVER TRUE

## 2025-02-25 ASSESSMENT — PATIENT HEALTH QUESTIONNAIRE - PHQ9
SUM OF ALL RESPONSES TO PHQ QUESTIONS 1-9: 0
2. FEELING DOWN, DEPRESSED OR HOPELESS: NOT AT ALL
1. LITTLE INTEREST OR PLEASURE IN DOING THINGS: NOT AT ALL
SUM OF ALL RESPONSES TO PHQ9 QUESTIONS 1 & 2: 0

## 2025-02-25 ASSESSMENT — ENCOUNTER SYMPTOMS
CHEST TIGHTNESS: 0
CONSTIPATION: 0
COUGH: 0
SORE THROAT: 0
ABDOMINAL PAIN: 0
VOMITING: 0
SHORTNESS OF BREATH: 0
DIARRHEA: 0
WHEEZING: 0
NAUSEA: 0
SINUS PAIN: 0
SINUS PRESSURE: 0
RHINORRHEA: 0

## 2025-02-26 DIAGNOSIS — N28.9 ABNORMAL KIDNEY FUNCTION: Primary | ICD-10-CM

## 2025-02-26 LAB
ANION GAP SERPL CALCULATED.3IONS-SCNC: 11 MMOL/L (ref 3–16)
BUN SERPL-MCNC: 22 MG/DL (ref 7–20)
CALCIUM SERPL-MCNC: 10.8 MG/DL (ref 8.3–10.6)
CHLORIDE SERPL-SCNC: 102 MMOL/L (ref 99–110)
CO2 SERPL-SCNC: 27 MMOL/L (ref 21–32)
CREAT SERPL-MCNC: 1.4 MG/DL (ref 0.8–1.3)
DEPRECATED RDW RBC AUTO: 15.1 % (ref 12.4–15.4)
GFR SERPLBLD CREATININE-BSD FMLA CKD-EPI: 55 ML/MIN/{1.73_M2}
GLUCOSE SERPL-MCNC: 96 MG/DL (ref 70–99)
HCT VFR BLD AUTO: 43.7 % (ref 40.5–52.5)
HGB BLD-MCNC: 14.5 G/DL (ref 13.5–17.5)
MCH RBC QN AUTO: 31.6 PG (ref 26–34)
MCHC RBC AUTO-ENTMCNC: 33.2 G/DL (ref 31–36)
MCV RBC AUTO: 95.2 FL (ref 80–100)
PLATELET # BLD AUTO: 197 K/UL (ref 135–450)
PMV BLD AUTO: 8.3 FL (ref 5–10.5)
POTASSIUM SERPL-SCNC: 4.1 MMOL/L (ref 3.5–5.1)
RBC # BLD AUTO: 4.59 M/UL (ref 4.2–5.9)
SODIUM SERPL-SCNC: 140 MMOL/L (ref 136–145)
WBC # BLD AUTO: 6.4 K/UL (ref 4–11)

## 2025-03-01 SDOH — HEALTH STABILITY: PHYSICAL HEALTH: ON AVERAGE, HOW MANY DAYS PER WEEK DO YOU ENGAGE IN MODERATE TO STRENUOUS EXERCISE (LIKE A BRISK WALK)?: 6 DAYS

## 2025-03-01 SDOH — HEALTH STABILITY: PHYSICAL HEALTH: ON AVERAGE, HOW MANY MINUTES DO YOU ENGAGE IN EXERCISE AT THIS LEVEL?: 90 MIN

## 2025-03-04 ENCOUNTER — OFFICE VISIT (OUTPATIENT)
Dept: ORTHOPEDIC SURGERY | Age: 65
End: 2025-03-04

## 2025-03-04 VITALS — WEIGHT: 192 LBS | RESPIRATION RATE: 14 BRPM | BODY MASS INDEX: 30.13 KG/M2 | HEIGHT: 67 IN

## 2025-03-04 DIAGNOSIS — M65.319 TRIGGER THUMB, ACQUIRED: Primary | ICD-10-CM

## 2025-03-04 RX ORDER — TRIAMCINOLONE ACETONIDE 40 MG/ML
20 INJECTION, SUSPENSION INTRA-ARTICULAR; INTRAMUSCULAR ONCE
Status: COMPLETED | OUTPATIENT
Start: 2025-03-04 | End: 2025-03-04

## 2025-03-04 RX ORDER — LIDOCAINE HYDROCHLORIDE 10 MG/ML
0.5 INJECTION, SOLUTION INFILTRATION; PERINEURAL ONCE
Status: COMPLETED | OUTPATIENT
Start: 2025-03-04 | End: 2025-03-04

## 2025-03-04 RX ADMIN — LIDOCAINE HYDROCHLORIDE 0.5 ML: 10 INJECTION, SOLUTION INFILTRATION; PERINEURAL at 13:58

## 2025-03-04 RX ADMIN — TRIAMCINOLONE ACETONIDE 20 MG: 40 INJECTION, SUSPENSION INTRA-ARTICULAR; INTRAMUSCULAR at 13:58

## 2025-03-04 NOTE — PROGRESS NOTES
This 65 y.o., right hand dominant retired man is seen in referral for Dulce Arrington MD with a chief complaint of pain, swelling, stiffness and intermittant snapping of the right thumb.  Symptoms have been present for 2 months.  The digit is stiff, especially in the morning and will frequently stick in the palm when flexed and pop when extended.  This is often associated with pain.  Mild swelling has been noticed.  The patient denies discoloration or history of injury or overuse.  Treatment has not been prescribed.  The problem has worsened recently.  The pain assessment has been reviewed and is correct as stated..    The patient's social history, past medical history, family history, medications, allergies and review of systems, entered 3/4/25, have been reviewed, and dated and are recorded in the chart.    On examination the patient is Height: 168.9 cm (5' 6.5\") tall and weighs Weight - Scale: 87.1 kg (192 lb). Respirations are 18 per minute.  The patient is well nourished, is oriented to time and place, demonstrates appropriate mood and affect as well as normal gait and station.  There is mild soft tissue swelling of the digit.  There is no deformity. There is tenderness, thickening and nodularity at the base of the flexor tendon sheath.  Range of motion is slightly limited in flexion and extension.  The digit sticks in flexion and pops into extension, accompanied by some pain. Skin is intact without lesions.  Distal circulation and sensation are intact.  Muscle strength and coordination are normal.  Reflexes are present bilaterally.  Joints are stable.  There are no subcutaneous nodules or enlarged epitrochlear lymph nodes.    I have personally reviewed and interpreted all previous external imaging studies (Xrays), laboratory tests (CBC, BMP, Lipid Panel, CMP, Uric Acid), diagnostic procedures and medical encounters pertinent to this patient's visit today.    Impression: Right thumb trigger digit.     The

## 2025-03-11 DIAGNOSIS — N28.9 ABNORMAL KIDNEY FUNCTION: ICD-10-CM

## 2025-03-12 ENCOUNTER — RESULTS FOLLOW-UP (OUTPATIENT)
Dept: PRIMARY CARE CLINIC | Age: 65
End: 2025-03-12

## 2025-03-12 LAB
ANION GAP SERPL CALCULATED.3IONS-SCNC: 11 MMOL/L (ref 3–16)
BUN SERPL-MCNC: 18 MG/DL (ref 7–20)
CALCIUM SERPL-MCNC: 11.1 MG/DL (ref 8.3–10.6)
CHLORIDE SERPL-SCNC: 102 MMOL/L (ref 99–110)
CO2 SERPL-SCNC: 27 MMOL/L (ref 21–32)
CREAT SERPL-MCNC: 1.3 MG/DL (ref 0.8–1.3)
GFR SERPLBLD CREATININE-BSD FMLA CKD-EPI: 60 ML/MIN/{1.73_M2}
GLUCOSE SERPL-MCNC: 95 MG/DL (ref 70–99)
POTASSIUM SERPL-SCNC: 4 MMOL/L (ref 3.5–5.1)
SODIUM SERPL-SCNC: 140 MMOL/L (ref 136–145)

## 2025-05-11 DIAGNOSIS — I10 ESSENTIAL HYPERTENSION: ICD-10-CM

## 2025-05-11 DIAGNOSIS — E78.2 MIXED HYPERLIPIDEMIA: ICD-10-CM

## 2025-05-11 DIAGNOSIS — M10.00 IDIOPATHIC GOUT, UNSPECIFIED CHRONICITY, UNSPECIFIED SITE: ICD-10-CM

## 2025-05-11 DIAGNOSIS — E79.0 ELEVATED BLOOD URIC ACID LEVEL: ICD-10-CM

## 2025-05-12 NOTE — TELEPHONE ENCOUNTER
Medication:   Requested Prescriptions     Pending Prescriptions Disp Refills    cloNIDine (CATAPRES) 0.1 MG tablet [Pharmacy Med Name: CLONIDINE 0.1MG TABLETS] 270 tablet 1     Sig: TAKE 1 TABLET BY MOUTH THREE TIMES DAILY    simvastatin (ZOCOR) 20 MG tablet [Pharmacy Med Name: SIMVASTATIN 20MG TABLETS] 90 tablet 1     Sig: TAKE 1 TABLET BY MOUTH EVERY NIGHT    allopurinol (ZYLOPRIM) 300 MG tablet [Pharmacy Med Name: ALLOPURINOL 300MG TABLETS] 90 tablet 1     Sig: TAKE 1 TABLET BY MOUTH DAILY    amLODIPine (NORVASC) 10 MG tablet [Pharmacy Med Name: AMLODIPINE BESYLATE 10MG TABLETS] 90 tablet 1     Sig: TAKE 1 TABLET BY MOUTH DAILY    olmesartan-hydroCHLOROthiazide (BENICAR HCT) 20-12.5 MG per tablet [Pharmacy Med Name: OLMESARTAN MEDOX/HCTZ 20-12.5MG TAB] 90 tablet 1     Sig: TAKE 1 TABLET BY MOUTH DAILY     Last Filled:  11.9.24    Last appt: 2/25/2025   Next appt: 5/15/2025    Last Lipid:   Lab Results   Component Value Date/Time    CHOL 182 11/15/2024 09:48 AM    TRIG 70 11/15/2024 09:48 AM    HDL 69 11/15/2024 09:48 AM

## 2025-05-13 RX ORDER — SIMVASTATIN 20 MG
20 TABLET ORAL NIGHTLY
Qty: 90 TABLET | Refills: 1 | Status: SHIPPED | OUTPATIENT
Start: 2025-05-13

## 2025-05-13 RX ORDER — AMLODIPINE BESYLATE 10 MG/1
10 TABLET ORAL DAILY
Qty: 90 TABLET | Refills: 1 | Status: SHIPPED | OUTPATIENT
Start: 2025-05-13

## 2025-05-13 RX ORDER — ALLOPURINOL 300 MG/1
300 TABLET ORAL DAILY
Qty: 90 TABLET | Refills: 1 | Status: SHIPPED | OUTPATIENT
Start: 2025-05-13

## 2025-05-13 RX ORDER — CLONIDINE HYDROCHLORIDE 0.1 MG/1
0.1 TABLET ORAL 3 TIMES DAILY
Qty: 270 TABLET | Refills: 1 | Status: SHIPPED | OUTPATIENT
Start: 2025-05-13

## 2025-05-13 RX ORDER — OLMESARTAN MEDOXOMIL AND HYDROCHLOROTHIAZIDE 20/12.5 20; 12.5 MG/1; MG/1
1 TABLET ORAL DAILY
Qty: 90 TABLET | Refills: 1 | Status: SHIPPED | OUTPATIENT
Start: 2025-05-13

## 2025-05-15 ENCOUNTER — OFFICE VISIT (OUTPATIENT)
Dept: PRIMARY CARE CLINIC | Age: 65
End: 2025-05-15

## 2025-05-15 VITALS
RESPIRATION RATE: 13 BRPM | WEIGHT: 192 LBS | BODY MASS INDEX: 30.13 KG/M2 | HEART RATE: 52 BPM | TEMPERATURE: 97.5 F | OXYGEN SATURATION: 100 % | HEIGHT: 67 IN | DIASTOLIC BLOOD PRESSURE: 80 MMHG | SYSTOLIC BLOOD PRESSURE: 122 MMHG

## 2025-05-15 DIAGNOSIS — N18.2 STAGE 2 CHRONIC KIDNEY DISEASE: ICD-10-CM

## 2025-05-15 DIAGNOSIS — E78.2 MIXED HYPERLIPIDEMIA: ICD-10-CM

## 2025-05-15 DIAGNOSIS — I10 ESSENTIAL HYPERTENSION: ICD-10-CM

## 2025-05-15 DIAGNOSIS — G43.009 MIGRAINE WITHOUT AURA AND WITHOUT STATUS MIGRAINOSUS, NOT INTRACTABLE: ICD-10-CM

## 2025-05-15 DIAGNOSIS — E83.52 HYPERCALCEMIA: ICD-10-CM

## 2025-05-15 DIAGNOSIS — M10.00 IDIOPATHIC GOUT, UNSPECIFIED CHRONICITY, UNSPECIFIED SITE: ICD-10-CM

## 2025-05-15 DIAGNOSIS — I10 ESSENTIAL HYPERTENSION: Primary | ICD-10-CM

## 2025-05-15 LAB
ALBUMIN SERPL-MCNC: 4.6 G/DL (ref 3.4–5)
ALBUMIN/GLOB SERPL: 2 {RATIO} (ref 1.1–2.2)
ALP SERPL-CCNC: 61 U/L (ref 40–129)
ALT SERPL-CCNC: 30 U/L (ref 10–40)
ANION GAP SERPL CALCULATED.3IONS-SCNC: 9 MMOL/L (ref 3–16)
AST SERPL-CCNC: 35 U/L (ref 15–37)
BILIRUB SERPL-MCNC: 0.4 MG/DL (ref 0–1)
BUN SERPL-MCNC: 18 MG/DL (ref 7–20)
CALCIUM SERPL-MCNC: 10.6 MG/DL (ref 8.3–10.6)
CHLORIDE SERPL-SCNC: 101 MMOL/L (ref 99–110)
CHOLEST SERPL-MCNC: 190 MG/DL (ref 0–199)
CO2 SERPL-SCNC: 29 MMOL/L (ref 21–32)
CREAT SERPL-MCNC: 1.3 MG/DL (ref 0.8–1.3)
GFR SERPLBLD CREATININE-BSD FMLA CKD-EPI: 60 ML/MIN/{1.73_M2}
GLUCOSE SERPL-MCNC: 111 MG/DL (ref 70–99)
HDLC SERPL-MCNC: 65 MG/DL (ref 40–60)
LDLC SERPL CALC-MCNC: 110 MG/DL
POTASSIUM SERPL-SCNC: 4.1 MMOL/L (ref 3.5–5.1)
PROT SERPL-MCNC: 6.9 G/DL (ref 6.4–8.2)
PTH-INTACT SERPL-MCNC: 67.5 PG/ML (ref 14–72)
SODIUM SERPL-SCNC: 139 MMOL/L (ref 136–145)
TRIGL SERPL-MCNC: 74 MG/DL (ref 0–150)
URATE SERPL-MCNC: 5.2 MG/DL (ref 3.5–7.2)
VLDLC SERPL CALC-MCNC: 15 MG/DL

## 2025-05-15 ASSESSMENT — ENCOUNTER SYMPTOMS
CHEST TIGHTNESS: 0
SINUS PAIN: 0
SINUS PRESSURE: 0
NAUSEA: 0
COUGH: 0
DIARRHEA: 0
SORE THROAT: 0
CONSTIPATION: 0
RHINORRHEA: 0
ABDOMINAL PAIN: 0
SHORTNESS OF BREATH: 0
VOMITING: 0
WHEEZING: 0

## 2025-05-15 NOTE — PROGRESS NOTES
Date of Visit: 5/15/2025    Reyes Nguyen (:  1960) is a 65 y.o. male,  Established patient here for evaluation of the following chief complaint(s):  Hypertension, Hyperlipidemia, Gout, and Migraine      ASSESSMENT/PLAN:    Assessment & Plan  1. Essential hypertension  - Hypertension is currently stable  - Continue olmesartan HCTZ 20-12.5 mg once daily  - Continue clonidine 0.1 mg 3 times daily  - Continue amlodipine 10 mg once daily  - low sodium diet  - regular aerobic exercise  - Comprehensive metabolic panel     2. Mixed hyperlipidemia  - Hyperlipidemia is currently stable  - Continue simvastatin 20 mg nightly  - low fat, low cholesterol diet  - regular aerobic exercise  - Lipid panel  - comprehensive metabolic panel     3. Idiopathic gout  - Gout is currently stable with no recent attacks  - Continue allopurinol 300 mg once daily  - Discussed reducing allopurinol dosage to half a tablet daily if uric acid levels are normal  - uric acid     4. Migraine without aura and without status migrainosus, not intractable  - Migraines are currently stable with one recent episode since the last visit  - Continue acetaminophen (Tylenol) as needed  - avoid migraine triggers     5. Stage 2 chronic kidney disease  - stable  - reports recent cessation of NSAIDs  - avoid NSAIDs  - maintain adequate hydration  - Referral to nephrology     6. Hypercalcemia  - repeat calcium as part of the comprehensive metabolic panel  - PTH   - Referral to endocrinology      Return in about 6 months (around 11/15/2025) for Medicare AW.    SUBJECTIVE:    History of Present Illness  The patient presents for a follow-up of hypertension, hyperlipidemia, gout, migraines, and chronic kidney disease.    He has hypertension and takes olmesartan HCTZ 20-12.5 mg once daily, amlodipine 10 mg once daily, and clonidine 0.1 mg three times daily. He maintains a low-sodium diet and engages in regular physical activity, including weightlifting and

## 2025-06-11 ENCOUNTER — OFFICE VISIT (OUTPATIENT)
Dept: ENDOCRINOLOGY | Age: 65
End: 2025-06-11
Payer: MEDICARE

## 2025-06-11 VITALS
DIASTOLIC BLOOD PRESSURE: 92 MMHG | OXYGEN SATURATION: 99 % | HEART RATE: 60 BPM | WEIGHT: 192.4 LBS | SYSTOLIC BLOOD PRESSURE: 175 MMHG | HEIGHT: 67 IN | BODY MASS INDEX: 30.2 KG/M2

## 2025-06-11 DIAGNOSIS — N18.2 STAGE 2 CHRONIC KIDNEY DISEASE: ICD-10-CM

## 2025-06-11 DIAGNOSIS — E21.3 HYPERPARATHYROIDISM: ICD-10-CM

## 2025-06-11 DIAGNOSIS — E55.9 VITAMIN D DEFICIENCY: ICD-10-CM

## 2025-06-11 DIAGNOSIS — E83.52 HYPERCALCEMIA: Primary | ICD-10-CM

## 2025-06-11 PROCEDURE — 3080F DIAST BP >= 90 MM HG: CPT | Performed by: INTERNAL MEDICINE

## 2025-06-11 PROCEDURE — 1123F ACP DISCUSS/DSCN MKR DOCD: CPT | Performed by: INTERNAL MEDICINE

## 2025-06-11 PROCEDURE — 3077F SYST BP >= 140 MM HG: CPT | Performed by: INTERNAL MEDICINE

## 2025-06-11 PROCEDURE — 99204 OFFICE O/P NEW MOD 45 MIN: CPT | Performed by: INTERNAL MEDICINE

## 2025-06-11 PROCEDURE — 1159F MED LIST DOCD IN RCRD: CPT | Performed by: INTERNAL MEDICINE

## 2025-06-11 PROCEDURE — 1160F RVW MEDS BY RX/DR IN RCRD: CPT | Performed by: INTERNAL MEDICINE

## 2025-06-11 PROCEDURE — G2211 COMPLEX E/M VISIT ADD ON: HCPCS | Performed by: INTERNAL MEDICINE

## 2025-06-11 NOTE — PROGRESS NOTES
DEXA BONE DENSITY PERIPHERAL; Future    Vitamin D deficiency  -     Comprehensive Metabolic Panel; Future  -     Vitamin D 25 Hydroxy; Future  -     PTH, Intact; Future  -     Phosphorus; Future  -     Magnesium; Future  -     PTH-Related Peptide; Future  -     Electrophoresis Protein, Serum; Future  -     Protein Electrophoresis, Urine; Future  -     TSH; Future  -     T4, Free; Future  -     Calcium, 24 HR Urine; Future  -     DEXA BONE DENSITY AXIAL SKELETON; Future  -     DEXA BONE DENSITY PERIPHERAL; Future    Hyperparathyroidism  -     Comprehensive Metabolic Panel; Future  -     Vitamin D 25 Hydroxy; Future  -     PTH, Intact; Future  -     Phosphorus; Future  -     Magnesium; Future  -     PTH-Related Peptide; Future  -     Electrophoresis Protein, Serum; Future  -     Protein Electrophoresis, Urine; Future  -     TSH; Future  -     T4, Free; Future  -     Calcium, 24 HR Urine; Future  -     DEXA BONE DENSITY AXIAL SKELETON; Future  -     DEXA BONE DENSITY PERIPHERAL; Future        1: Hypercalcemia and hyperparathyroidism   Complicated with CKD stage 2     Check thyroid profile, PTH, phos, Vit D, CMP, Mag     If Vit D normal then check 24 hour urine calcium/cr (after holding HCTZ for 2 weeks)     X ray hands/skull for very high calcium     2: Osteoporosis screen     Will do DXA scan     3: Vit D def   Check levels   C/w MVT daily       RTC in 3 months       Electronically signed by ANN LOOMIS MD on 6/11/2025 at 2:22 PM

## 2025-06-13 DIAGNOSIS — N18.2 STAGE 2 CHRONIC KIDNEY DISEASE: ICD-10-CM

## 2025-06-13 DIAGNOSIS — E55.9 VITAMIN D DEFICIENCY: ICD-10-CM

## 2025-06-13 DIAGNOSIS — E83.52 HYPERCALCEMIA: ICD-10-CM

## 2025-06-13 DIAGNOSIS — E21.3 HYPERPARATHYROIDISM: ICD-10-CM

## 2025-06-13 LAB
25(OH)D3 SERPL-MCNC: 48.7 NG/ML
ALBUMIN SERPL-MCNC: 4.6 G/DL (ref 3.4–5)
ALBUMIN/GLOB SERPL: 1.8 {RATIO} (ref 1.1–2.2)
ALP SERPL-CCNC: 61 U/L (ref 40–129)
ALT SERPL-CCNC: 27 U/L (ref 10–40)
ANION GAP SERPL CALCULATED.3IONS-SCNC: 9 MMOL/L (ref 3–16)
AST SERPL-CCNC: 32 U/L (ref 15–37)
BILIRUB SERPL-MCNC: 0.4 MG/DL (ref 0–1)
BUN SERPL-MCNC: 18 MG/DL (ref 7–20)
CALCIUM 24H UR-MRATE: 80 MG/24 HR (ref 42–353)
CALCIUM SERPL-MCNC: 10.9 MG/DL (ref 8.3–10.6)
CHLORIDE SERPL-SCNC: 103 MMOL/L (ref 99–110)
CO2 SERPL-SCNC: 29 MMOL/L (ref 21–32)
CREAT 24H UR-MRATE: 1.9 G/24HR (ref 0.6–2.5)
CREAT SERPL-MCNC: 1.1 MG/DL (ref 0.8–1.3)
GFR SERPLBLD CREATININE-BSD FMLA CKD-EPI: 73 ML/MIN/{1.73_M2}
GLUCOSE SERPL-MCNC: 91 MG/DL (ref 70–99)
MAGNESIUM SERPL-MCNC: 2.14 MG/DL (ref 1.8–2.4)
PHOSPHATE SERPL-MCNC: 2.3 MG/DL (ref 2.5–4.9)
POTASSIUM SERPL-SCNC: 3.9 MMOL/L (ref 3.5–5.1)
PROT SERPL-MCNC: 7.2 G/DL (ref 6.4–8.2)
PROT UR-MCNC: 0.01 G/DL
PROT UR-MCNC: 9.45 MG/DL
PTH-INTACT SERPL-MCNC: 61.6 PG/ML (ref 14–72)
SODIUM SERPL-SCNC: 141 MMOL/L (ref 136–145)
SPECIMEN VOL 24H UR: 2100 ML
T4 FREE SERPL-MCNC: 1.1 NG/DL (ref 0.9–1.8)
TSH SERPL DL<=0.005 MIU/L-ACNC: 1 UIU/ML (ref 0.27–4.2)

## 2025-06-16 LAB
ALBUMIN SERPL ELPH-MCNC: 3.6 G/DL (ref 3.1–4.9)
ALPHA1 GLOB SERPL ELPH-MCNC: 0.3 G/DL (ref 0.2–0.4)
ALPHA2 GLOB SERPL ELPH-MCNC: 0.7 G/DL (ref 0.4–1.1)
B-GLOBULIN SERPL ELPH-MCNC: 1.3 G/DL (ref 0.9–1.6)
GAMMA GLOB SERPL ELPH-MCNC: 1.3 G/DL (ref 0.6–1.8)
PROT PATTERN UR ELPH-IMP: NORMAL
SPE/IFE INTERPRETATION: NORMAL

## 2025-06-18 LAB — PTH RELATED PROT SERPL-SCNC: <2 PMOL/L (ref 0–2.3)

## 2025-06-19 ENCOUNTER — RESULTS FOLLOW-UP (OUTPATIENT)
Dept: ENDOCRINOLOGY | Age: 65
End: 2025-06-19

## 2025-06-27 ENCOUNTER — HOSPITAL ENCOUNTER (OUTPATIENT)
Dept: GENERAL RADIOLOGY | Age: 65
Discharge: HOME | End: 2025-06-27
Attending: INTERNAL MEDICINE
Payer: MEDICARE

## 2025-06-27 DIAGNOSIS — E83.52 HYPERCALCEMIA: ICD-10-CM

## 2025-06-27 DIAGNOSIS — E21.3 HYPERPARATHYROIDISM: ICD-10-CM

## 2025-06-27 DIAGNOSIS — E55.9 VITAMIN D DEFICIENCY: ICD-10-CM

## 2025-06-27 DIAGNOSIS — N18.2 STAGE 2 CHRONIC KIDNEY DISEASE: ICD-10-CM

## 2025-06-27 PROCEDURE — 77081 DXA BONE DENSITY APPENDICULR: CPT

## 2025-06-27 PROCEDURE — 77080 DXA BONE DENSITY AXIAL: CPT

## 2025-08-15 DIAGNOSIS — E79.0 ELEVATED BLOOD URIC ACID LEVEL: ICD-10-CM

## 2025-08-15 DIAGNOSIS — I10 ESSENTIAL HYPERTENSION: ICD-10-CM

## 2025-08-15 DIAGNOSIS — M10.00 IDIOPATHIC GOUT, UNSPECIFIED CHRONICITY, UNSPECIFIED SITE: ICD-10-CM

## 2025-08-15 DIAGNOSIS — E78.2 MIXED HYPERLIPIDEMIA: ICD-10-CM

## 2025-08-15 RX ORDER — ALLOPURINOL 300 MG/1
300 TABLET ORAL DAILY
Qty: 90 TABLET | Refills: 1 | Status: SHIPPED | OUTPATIENT
Start: 2025-08-15

## 2025-08-15 RX ORDER — SIMVASTATIN 20 MG
20 TABLET ORAL NIGHTLY
Qty: 90 TABLET | Refills: 1 | Status: SHIPPED | OUTPATIENT
Start: 2025-08-15

## 2025-08-15 RX ORDER — AMLODIPINE BESYLATE 10 MG/1
10 TABLET ORAL DAILY
Qty: 90 TABLET | Refills: 1 | Status: SHIPPED | OUTPATIENT
Start: 2025-08-15